# Patient Record
Sex: FEMALE | Race: WHITE | HISPANIC OR LATINO | Employment: PART TIME | URBAN - METROPOLITAN AREA
[De-identification: names, ages, dates, MRNs, and addresses within clinical notes are randomized per-mention and may not be internally consistent; named-entity substitution may affect disease eponyms.]

---

## 2017-06-12 ENCOUNTER — OFFICE VISIT (OUTPATIENT)
Dept: URGENT CARE | Facility: CLINIC | Age: 42
End: 2017-06-12
Payer: COMMERCIAL

## 2017-06-12 VITALS
WEIGHT: 170 LBS | BODY MASS INDEX: 27.32 KG/M2 | OXYGEN SATURATION: 99 % | RESPIRATION RATE: 14 BRPM | DIASTOLIC BLOOD PRESSURE: 60 MMHG | TEMPERATURE: 98.6 F | HEART RATE: 78 BPM | SYSTOLIC BLOOD PRESSURE: 124 MMHG | HEIGHT: 66 IN

## 2017-06-12 DIAGNOSIS — Z34.90 PREGNANCY, UNSPECIFIED GESTATIONAL AGE: ICD-10-CM

## 2017-06-12 LAB
APPEARANCE UR: CLEAR
BILIRUB UR STRIP-MCNC: NORMAL MG/DL
COLOR UR AUTO: NORMAL
GLUCOSE UR STRIP.AUTO-MCNC: NORMAL MG/DL
INT CON NEG: NEGATIVE
INT CON POS: POSITIVE
KETONES UR STRIP.AUTO-MCNC: NORMAL MG/DL
LEUKOCYTE ESTERASE UR QL STRIP.AUTO: NORMAL
NITRITE UR QL STRIP.AUTO: NORMAL
PH UR STRIP.AUTO: 6.5 [PH] (ref 5–8)
POC URINE PREGNANCY TEST: POSITIVE
PROT UR QL STRIP: NORMAL MG/DL
RBC UR QL AUTO: NORMAL
SP GR UR STRIP.AUTO: 1.01
UROBILINOGEN UR STRIP-MCNC: NORMAL MG/DL

## 2017-06-12 PROCEDURE — 81025 URINE PREGNANCY TEST: CPT | Performed by: PHYSICIAN ASSISTANT

## 2017-06-12 PROCEDURE — 81002 URINALYSIS NONAUTO W/O SCOPE: CPT | Performed by: PHYSICIAN ASSISTANT

## 2017-06-12 PROCEDURE — 99214 OFFICE O/P EST MOD 30 MIN: CPT | Performed by: PHYSICIAN ASSISTANT

## 2017-06-12 ASSESSMENT — ENCOUNTER SYMPTOMS
DIAPHORESIS: 0
PALPITATIONS: 0
NAUSEA: 1
ABDOMINAL PAIN: 0
DIARRHEA: 0
WEAKNESS: 0
CONSTIPATION: 0
CHILLS: 0
WEIGHT LOSS: 0
DIZZINESS: 0
HEARTBURN: 0
FEVER: 0
BLOOD IN STOOL: 0
COUGH: 0
VOMITING: 0
SHORTNESS OF BREATH: 0

## 2017-06-12 NOTE — PROGRESS NOTES
"Subjective:      Silvia Atkins is a 41 y.o. female who presents with Nausea            Nausea  This is a new problem. The current episode started 1 to 4 weeks ago. The problem occurs intermittently. The problem has been waxing and waning. Associated symptoms include nausea. Pertinent negatives include no abdominal pain, chest pain, chills, coughing, diaphoresis, fever, vomiting or weakness. Associated symptoms comments: Last menstruation 4/27/2017. Nothing aggravates the symptoms. She has tried nothing for the symptoms.       Review of Systems   Constitutional: Negative for fever, chills, weight loss, malaise/fatigue and diaphoresis.   Respiratory: Negative for cough and shortness of breath.    Cardiovascular: Negative for chest pain and palpitations.   Gastrointestinal: Positive for nausea. Negative for heartburn, vomiting, abdominal pain, diarrhea, constipation, blood in stool and melena.   Neurological: Negative for dizziness and weakness.     All other systems reviewed and are negative.  PMH:  has no past medical history on file.  MEDS:   Current outpatient prescriptions:   •  hydrocodone-acetaminophen (NORCO) 5-325 MG Tab per tablet, Take 1 Tab by mouth every 6 hours as needed (severe pain)., Disp: 20 Tab, Rfl: 0  ALLERGIES: No Known Allergies  SURGHX: History reviewed. No pertinent past surgical history.  SOCHX:  reports that she has never smoked. She does not have any smokeless tobacco history on file.  FH: Family history was reviewed, no pertinent findings to report  Medications, Allergies, and current problem list reviewed today in Epic       Objective:     /60 mmHg  Pulse 78  Temp(Src) 37 °C (98.6 °F)  Resp 14  Ht 1.676 m (5' 6\")  Wt 77.111 kg (170 lb)  BMI 27.45 kg/m2  SpO2 99%  LMP 04/27/2017  Breastfeeding? No     Physical Exam   Constitutional: She is oriented to person, place, and time. Vital signs are normal. She appears well-developed and well-nourished.  Non-toxic appearance. " She does not have a sickly appearance. She does not appear ill. No distress.   Neck: Normal range of motion. Neck supple.   Cardiovascular: Normal rate and regular rhythm.    Pulmonary/Chest: Effort normal and breath sounds normal.   Abdominal: Soft. Bowel sounds are normal. She exhibits no shifting dullness, no distension, no abdominal bruit, no pulsatile midline mass and no mass. There is no tenderness. There is no rigidity, no rebound, no guarding, no CVA tenderness, no tenderness at McBurney's point and negative Bradford's sign. No hernia.   Neurological: She is alert and oriented to person, place, and time.   Skin: Skin is warm and dry.   Psychiatric: She has a normal mood and affect. Her behavior is normal. Judgment and thought content normal.   Vitals reviewed.         Component Value Ref Range & Units Status      POC Color STRAW Negative Final     POC Appearance CLEAR Negative Final     POC Leukocyte Esterase NEG Negative Final     POC Nitrites NEG Negative Final     POC Urobiligen NEG Negative (0.2) mg/dL Final     POC Protein TR Negative mg/dL Final     POC Urine PH 6.5 5.0 - 8.0 Final     POC Blood LG Negative Final     POC Specific Gravity 1.010 <1.005 - >1.030 Final     POC Ketones NEG Negative mg/dL Final     POC Biliruben NEG Negative mg/dL Final     POC Glucose NEG Negative mg/dL Final       PREG: POS   Assessment/Plan:     1. Pregnancy, unspecified gestational age    - REFERRAL TO OB/GYN  - POCT Urinalysis  - POC Urine Pregnancy    Differential diagnosis, natural history, supportive care, and indications for immediate follow-up discussed at length.   Follow-up with primary care provider within 4-5 days, emergency room precautions discussed.  Patient and/or family appears understanding of information.

## 2017-06-12 NOTE — PATIENT INSTRUCTIONS
Embarazo  (Pregnancy)  Si planea quedar embarazada, es alisia buena idea concertar alisia ashley de preconcepción con el médico para poder lograr un estilo de roopa saludable ante de quedar embarazada. [place] incluye dieta, peso, ejercicio, el lexx vitaminas prenatales en especial ácido fólico (ayuda a prevenir defectos en el cerebro y la médula keller), evitar el alcohol, fumar, las drogas ilegales, problemas médicos (diabetes, convulsiones), historial familiar de problemas genéticos, condiciones de trabajo e inmunizaciones. Es mejor tener conocimiento de estas cosas y hacer algo antes de quedar embarazada.  Si está embarazada, es necesario que siga ciertas pautas para tener un bebé tatyana. Es muy importante realizar controles prenatales adecuados y seguir las indicaciones del profesional que la asiste. La atención prenatal incluye toda la asistencia médica que usted recibe antes del nacimiento del bebé. [place] ayuda a prevenir problemas coco el embarazo y el parto.  INSTRUCCIONES PARA EL CUIDADO DOMICILIARIO  · Comience las consultas prenatales alrededor de la 12ª semana de embarazo o lo antes posible. Al principio generalmente se programan cada mes. Se hacen más frecuentes en los 2 últimos meses antes del parto. Es importante que concurra a todas las citas con el profesional y siga kelly instrucciones con respecto a los medicamentos que deba utilizar, a la actividad física y a la dieta.  · Coco el embarazo debe obtener nutrientes para usted y para yanes bebé. Consuma alisia dieta normal y aravind balanceada. Elija alimentos kait carne, pescado, leche y otros productos lácteos, vegetales, frutas, panes integrales y cereales El profesional le informará cuál es el aumento de peso ideal, según yanes peso y altura actuales. Romelia gran cantidad de líquidos. Trate de beber 8 vasos de líquidos por día.  · El alcohol se asocia a cierto número de defectos del nacimiento, incluyendo el síndrome de alcoholismo fetal. Lo mejor es evitarlo  completamente El cigarrillo causa nacimientos prematuros y bebés de bajo peso al nacer. El consumo de alcohol y nicotina coco el embarazo también aumentan marcadamente la probabilidad de que el kyle sea químicamente dependiente en etapas posteriores de yanes roopa y puede contribuir al síndrome de muerte súbita infantil (SMSI)  · No consuma drogas.  · Solo tome medicamentos prescriptos o de venta julio c que le haya recomendado el profesional. Algunos medicamentos pueden causar problemas genéticos y físicos al bebé  · Las náuseas matinales pueden aliviarse si come algunas galletitas saladas en la cama. Coma dos galletitas antes de levantarse por la mañana.  · Las relaciones sexuales pueden continuarse hasta leslie el final del embarazo, si no se presentan otros problemas kait pérdida prematura (antes de tiempo) de líquido amniótico, hemorragia vaginal, dolor coco las relaciones sexuales o dolor abdominal (en el vientre).  · Practique ejercicios con regularidad. Consulte con el profesional que la asiste si no sabe con certeza si determinados ejercicios son seguros.  · No utilice la bañera con [place], nathalie turcos y saunas. Estos aumentan el riesgo de sufrir un desmayo o de pérdida del conocimiento, y así lastimarse usted o el bebé. La natación es un buen ejercicio. Descanse todo lo que pueda e incluya alisia siesta después de almorzar siempre que le sea posible, especialmente coco el tercer trimestre.  · Evite los olores y las sustancias químicas tóxicas.  · No use zapatos de tacones altos, podría perder el equilibrio y caer.  · No levante objetos de más de 2,5 kg. Si levanta un objeto, flexione las piernas y los muslos, y no la espalda.  · Evite los viajes largos, especialmente en el tercer trimestre.  · Si debe viajar fuera de la ciudad o de yanes estado, lleve alisia copia de la historia clínica.  SOLICITE ATENCIÓN MÉDICA DE INMEDIATO SI:  · La temperatura oral se eleva sin motivo por encima de 38,9° C (102° F) o  según le indique el profesional que lo asiste.  · Tiene alisia pérdida de líquido por la vagina. Si sospecha alisia ruptura de las membranas, tómese la temperatura y llame al profesional para informarlo sobre esto.  · Observa unas pequeñas manchas o alisia hemorragia vaginal Notifique al profesional acerca de la cantidad y de cuántos apósitos está utilizando.  · Continúa teniendo náuseas y no obtiene alivio de los medicamentos que le tipton indicado, o vomita reese o alisia sustancia similar a la borra del café.  · Presenta un dolor en la estefany superior del abdomen.  · Siente molestias en el ligamento liliana en la parte abdominal baja. El profesional que la asiste la evaluará.  · Siente pequeñas contracciones del útero (matriz)  · No siente que el bebé se mueve, o percibe menos movimientos que antes.  · Siente dolor al orinar.  · Observa alisia hemorragia vaginal anormal.  · Tiene diarrea persistente.  · Sufre alisia cefalea grave.  · Tiene problemas visuales.  · Comienza a sentir debilidad muscular.  · Se siente mareada o sufre un desmayo.  · Comienza a sentir falta de aire.  · Siente dolor en el pecho.  · Sufre dolor en la espalda que se irradia hacia la pierna y el pie.  · Siente latidos cardíacos irregulares o la frecuencia cardíaca es muy rápida.  · Aumenta excesivamente de peso en un período breve (2,5 kg en 3 a 5 días)  · Se ve envuelta en alisia situación de violencia doméstica.  Document Released: 09/27/2006 Document Revised: 03/11/2013  ExitBabyWatch® Patient Information ©2014 [place].

## 2017-06-12 NOTE — MR AVS SNAPSHOT
"        Silviafelicia Atkins   2017 1:15 PM   Office Visit   MRN: [id number]    Department:  [facility]   Dept Phone:  [phone]    Description:  Female : 1975   Provider:  Theo Marcus PA-C           Reason for Visit     Nausea pt wondering if she may be pregnant . lmp 4-27-16 . nausea .      Allergies as of 2017     No Known Allergies      You were diagnosed with     Pregnancy, unspecified gestational age   [[id number]]         Vital Signs     Blood Pressure Pulse Temperature Respirations Height Weight    124/60 mmHg 78 37 °C (98.6 °F) 14 1.676 m (5' 6\") 77.111 kg (170 lb)    Body Mass Index Oxygen Saturation Last Menstrual Period Breastfeeding? Smoking Status       27.45 kg/m2 99% 2017 No Never Smoker        Basic Information     Date Of Birth Sex Race Ethnicity Preferred Language    1975 Female White  Origin (Bulgarian,Somali,Pakistani,[place], etc) English      Health Maintenance        Date Due Completion Dates    IMM DTaP/Tdap/Td Vaccine (1 - Tdap) 10/14/1993 ---    PAP SMEAR 10/14/1995 ---    MAMMOGRAM 10/14/2014 ---            Results     POCT Urinalysis      Component Value Standard Range & Units    POC Color STRAW Negative    POC Appearance CLEAR Negative    POC Leukocyte Esterase NEG Negative    POC Nitrites NEG Negative    POC Urobiligen NEG Negative (0.2) mg/dL    POC Protein TR Negative mg/dL    POC Urine PH 6.5 5.0 - 8.0    POC Blood LG Negative    POC Specific Gravity 1.010 <1.005 - >1.030    POC Ketones NEG Negative mg/dL    POC Biliruben NEG Negative mg/dL    POC Glucose NEG Negative mg/dL                POC Urine Pregnancy      Component Value Standard Range & Units    POC Urine Pregnancy Test Positive Negative    Internal Control Positive Positive     Internal Control Negative Negative                         Current Immunizations     No immunizations on file.      Below and/or attached are the medications your provider expects you to take. Review " all of your home medications and newly ordered medications with your provider and/or pharmacist. Follow medication instructions as directed by your provider and/or pharmacist. Please keep your medication list with you and share with your provider. Update the information when medications are discontinued, doses are changed, or new medications (including over-the-counter products) are added; and carry medication information at all times in the event of emergency situations     Allergies:  No Known Allergies          Medications  Valid as of: June 12, 2017 -  2:33 PM    Generic Name Brand Name Tablet Size Instructions for use    Hydrocodone-Acetaminophen (Tab) NORCO 5-325 MG Take 1 Tab by mouth every 6 hours as needed (severe pain).        .                 Medicines prescribed today were sent to:     [facility] [address] [place] [address] [place], [place] - [address] AT [facility]    [address] [place] [address]    Phone: [phone] Fax: [phone]    Open 24 Hours?: No      Medication refill instructions:       If your prescription bottle indicates you have medication refills left, it is not necessary to call your provider’s office. Please contact your pharmacy and they will refill your medication.    If your prescription bottle indicates you do not have any refills left, you may request refills at any time through one of the following ways: The online [employer] system (except Urgent Care), by calling your provider’s office, or by asking your pharmacy to contact your provider’s office with a refill request. Medication refills are processed only during regular business hours and may not be available until the next business day. Your provider may request additional information or to have a follow-up visit with you prior to refilling your medication.   *Please Note: Medication refills are assigned a new Rx number when refilled electronically. Your pharmacy may indicate that no refills were authorized even  though a new prescription for the same medication is available at the pharmacy. Please request the medicine by name with the pharmacy before contacting your provider for a refill.        Referral     A referral request has been sent to our patient care coordination department. Please allow 3-5 business days for us to process this request and contact you either by phone or mail. If you do not hear from us by the 5th business day, please call us at [phone].        Instructions    Embarazo  (Pregnancy)  Si planea quedar embarazada, es alisia buena idea concertar alisia ashley de preconcepción con el médico para poder lograr un estilo de roopa saludable ante de quedar embarazada. [place] incluye dieta, peso, ejercicio, el lexx vitaminas prenatales en especial ácido fólico (ayuda a prevenir defectos en el cerebro y la médula keller), evitar el alcohol, fumar, las drogas ilegales, problemas médicos (diabetes, convulsiones), historial familiar de problemas genéticos, condiciones de trabajo e inmunizaciones. Es mejor tener conocimiento de estas cosas y hacer algo antes de quedar embarazada.  Si está embarazada, es necesario que siga ciertas pautas para tener un bebé tatyana. Es muy importante realizar controles prenatales adecuados y seguir las indicaciones del profesional que la asiste. La atención prenatal incluye toda la asistencia médica que usted recibe antes del nacimiento del bebé. [place] ayuda a prevenir problemas coco el embarazo y el parto.  INSTRUCCIONES PARA EL CUIDADO DOMICILIARIO  · Comience las consultas prenatales alrededor de la 12ª semana de embarazo o lo antes posible. Al principio generalmente se programan cada mes. Se hacen más frecuentes en los 2 últimos meses antes del parto. Es importante que concurra a todas las citas con el profesional y siga kelly instrucciones con respecto a los medicamentos que deba utilizar, a la actividad física y a la dieta.  · Coco el embarazo debe obtener nutrientes para usted  y para yanes bebé. Consuma alisia dieta normal y aravind balanceada. Elija alimentos kait carne, pescado, leche y otros productos lácteos, vegetales, frutas, panes integrales y cereales El profesional le informará cuál es el aumento de peso ideal, según yanes peso y altura actuales. Rom[place] gran cantidad de líquidos. Trate de beber 8 vasos de líquidos por día.  · El alcohol se asocia a cierto número de defectos del nacimiento, incluyendo el síndrome de alcoholismo fetal. Lo mejor es evitarlo completamente El cigarrillo causa nacimientos prematuros y bebés de bajo peso al nacer. El consumo de alcohol y nicotina coco el embarazo también aumentan marcadamente la probabilidad de que el kyle sea químicamente dependiente en etapas posteriores de yanes roopa y puede contribuir al síndrome de muerte súbita infantil (SMSI)  · No consuma drogas.  · Solo tome medicamentos prescriptos o de venta julio c que le haya recomendado el profesional. Algunos medicamentos pueden causar problemas genéticos y físicos al bebé  · Las náuseas matinales pueden aliviarse si come algunas galletitas saladas en la cama. Coma dos galletitas antes de levantarse por la mañana.  · Las relaciones sexuales pueden continuarse hasta leslie el final del embarazo, si no se presentan otros problemas kait pérdida prematura (antes de tiempo) de líquido amniótico, hemorragia vaginal, dolor coco las relaciones sexuales o dolor abdominal (en el vientre).  · Practique ejercicios con regularidad. Consulte con el profesional que la asiste si no sabe con certeza si determinados ejercicios son seguros.  · No utilice la bañera con [place], nathalie turcos y saunas. Estos aumentan el riesgo de sufrir un desmayo o de pérdida del conocimiento, y así lastimarse usted o el bebé. La natación es un buen ejercicio. Descanse todo lo que pueda e incluya alisia siesta después de almorzar siempre que le sea posible, especialmente coco el tercer trimestre.  · Evite los olores y las sustancias  químicas tóxicas.  · No use zapatos de tacones altos, podría perder el equilibrio y caer.  · No levante objetos de más de 2,5 kg. Si levanta un objeto, flexione las piernas y los muslos, y no la espalda.  · Evite los viajes largos, especialmente en el tercer trimestre.  · Si debe viajar fuera de la ciudad o de yanes estado, lleve alisia copia de la historia clínica.  SOLICITE ATENCIÓN MÉDICA DE INMEDIATO SI:  · La temperatura oral se eleva sin motivo por encima de 38,9° C (102° F) o según le indique el profesional que lo asiste.  · Tiene alisia pérdida de líquido por la vagina. Si sospecha alisia ruptura de las membranas, tómese la temperatura y llame al profesional para informarlo sobre esto.  · Observa unas pequeñas manchas o alisia hemorragia vaginal Notifique al profesional acerca de la cantidad y de cuántos apósitos está utilizando.  · Continúa teniendo náuseas y no obtiene alivio de los medicamentos que le tipton indicado, o vomita reese o alisia sustancia similar a la borra del café.  · Presenta un dolor en la estefany superior del abdomen.  · Siente molestias en el ligamento liliana en la parte abdominal baja. El profesional que la asiste la evaluará.  · Siente pequeñas contracciones del útero (matriz)  · No siente que el bebé se mueve, o percibe menos movimientos que antes.  · Siente dolor al orinar.  · Observa alisia hemorragia vaginal anormal.  · Tiene diarrea persistente.  · Sufre alisia cefalea grave.  · Tiene problemas visuales.  · Comienza a sentir debilidad muscular.  · Se siente mareada o sufre un desmayo.  · Comienza a sentir falta de aire.  · Siente dolor en el pecho.  · Sufre dolor en la espalda que se irradia hacia la pierna y el pie.  · Siente latidos cardíacos irregulares o la frecuencia cardíaca es muy rápida.  · Aumenta excesivamente de peso en un período breve (2,5 kg en 3 a 5 días)  · Se ve envuelta en alisia situación de violencia doméstica.  Document Released: 09/27/2006 Document Revised: 03/11/2013  ExitCare® Patient  Information ©2014 [place] [employer], [facility].              [employer] Access Code: Activation code not generated  Current [employer] Status: Active

## 2017-07-12 LAB — PHYSICIAN READ PAP  881411: NEGATIVE

## 2017-07-18 ENCOUNTER — APPOINTMENT (OUTPATIENT)
Dept: OBGYN | Facility: CLINIC | Age: 42
End: 2017-07-18
Payer: COMMERCIAL

## 2017-08-04 ENCOUNTER — HOSPITAL ENCOUNTER (OUTPATIENT)
Dept: LAB | Facility: MEDICAL CENTER | Age: 42
End: 2017-08-04
Attending: NURSE PRACTITIONER
Payer: COMMERCIAL

## 2017-08-04 ENCOUNTER — INITIAL PRENATAL (OUTPATIENT)
Dept: OBGYN | Facility: CLINIC | Age: 42
End: 2017-08-04

## 2017-08-04 VITALS — HEIGHT: 66 IN

## 2017-08-04 DIAGNOSIS — Z87.59 HISTORY OF PRECIPITOUS DELIVERY: ICD-10-CM

## 2017-08-04 DIAGNOSIS — Z98.890 HISTORY OF REVERSAL OF TUBAL LIGATION: ICD-10-CM

## 2017-08-04 DIAGNOSIS — Z98.51 HISTORY OF BILATERAL TUBAL LIGATION: ICD-10-CM

## 2017-08-04 DIAGNOSIS — Z34.82 ENCOUNTER FOR SUPERVISION OF OTHER NORMAL PREGNANCY IN SECOND TRIMESTER: ICD-10-CM

## 2017-08-04 DIAGNOSIS — O09.522 ADVANCED MATERNAL AGE IN MULTIGRAVIDA, SECOND TRIMESTER: ICD-10-CM

## 2017-08-04 DIAGNOSIS — Z34.82 ENCOUNTER FOR SUPERVISION OF OTHER NORMAL PREGNANCY IN SECOND TRIMESTER: Primary | ICD-10-CM

## 2017-08-04 PROBLEM — O09.529 ADVANCED MATERNAL AGE IN MULTIGRAVIDA: Status: ACTIVE | Noted: 2017-08-04

## 2017-08-04 LAB
ABO GROUP BLD: NORMAL
APPEARANCE UR: CLEAR
APPEARANCE UR: NORMAL
BASOPHILS # BLD AUTO: 0.5 % (ref 0–1.8)
BASOPHILS # BLD: 0.05 K/UL (ref 0–0.12)
BILIRUB UR QL STRIP.AUTO: NEGATIVE
BILIRUB UR STRIP-MCNC: NORMAL MG/DL
BLD GP AB SCN SERPL QL: NORMAL
COLOR UR AUTO: NORMAL
COLOR UR: ABNORMAL
CULTURE IF INDICATED INDCX: NO UA CULTURE
EOSINOPHIL # BLD AUTO: 0.1 K/UL (ref 0–0.51)
EOSINOPHIL NFR BLD: 1 % (ref 0–6.9)
ERYTHROCYTE [DISTWIDTH] IN BLOOD BY AUTOMATED COUNT: 40.5 FL (ref 35.9–50)
GLUCOSE UR STRIP.AUTO-MCNC: NEGATIVE MG/DL
GLUCOSE UR STRIP.AUTO-MCNC: NEGATIVE MG/DL
HBV SURFACE AG SER QL: NEGATIVE
HCT VFR BLD AUTO: 39.5 % (ref 37–47)
HGB BLD-MCNC: 13.1 G/DL (ref 12–16)
HIV 1+2 AB+HIV1 P24 AG SERPL QL IA: NON REACTIVE
IMM GRANULOCYTES # BLD AUTO: 0.03 K/UL (ref 0–0.11)
IMM GRANULOCYTES NFR BLD AUTO: 0.3 % (ref 0–0.9)
KETONES UR STRIP.AUTO-MCNC: NEGATIVE MG/DL
KETONES UR STRIP.AUTO-MCNC: NEGATIVE MG/DL
LEUKOCYTE ESTERASE UR QL STRIP.AUTO: NEGATIVE
LEUKOCYTE ESTERASE UR QL STRIP.AUTO: NEGATIVE
LYMPHOCYTES # BLD AUTO: 2.04 K/UL (ref 1–4.8)
LYMPHOCYTES NFR BLD: 21.3 % (ref 22–41)
MCH RBC QN AUTO: 28.9 PG (ref 27–33)
MCHC RBC AUTO-ENTMCNC: 33.2 G/DL (ref 33.6–35)
MCV RBC AUTO: 87 FL (ref 81.4–97.8)
MICRO URNS: ABNORMAL
MONOCYTES # BLD AUTO: 0.53 K/UL (ref 0–0.85)
MONOCYTES NFR BLD AUTO: 5.5 % (ref 0–13.4)
NEUTROPHILS # BLD AUTO: 6.85 K/UL (ref 2–7.15)
NEUTROPHILS NFR BLD: 71.4 % (ref 44–72)
NITRITE UR QL STRIP.AUTO: NEGATIVE
NITRITE UR QL STRIP.AUTO: NEGATIVE
NRBC # BLD AUTO: 0 K/UL
NRBC BLD AUTO-RTO: 0 /100 WBC
PH UR STRIP.AUTO: 7 [PH]
PH UR STRIP.AUTO: 7 [PH] (ref 5–8)
PLATELET # BLD AUTO: 257 K/UL (ref 164–446)
PMV BLD AUTO: 9.7 FL (ref 9–12.9)
PROT UR QL STRIP: NEGATIVE MG/DL
PROT UR QL STRIP: NORMAL MG/DL
RBC # BLD AUTO: 4.54 M/UL (ref 4.2–5.4)
RBC UR QL AUTO: NEGATIVE
RBC UR QL AUTO: NORMAL
RH BLD: NORMAL
RUBV AB SER QL: 289.6 IU/ML
SP GR UR STRIP.AUTO: 1
SP GR UR STRIP.AUTO: 1.01
TREPONEMA PALLIDUM IGG+IGM AB [PRESENCE] IN SERUM OR PLASMA BY IMMUNOASSAY: NON REACTIVE
UROBILINOGEN UR STRIP-MCNC: NORMAL MG/DL
UROBILINOGEN UR STRIP.AUTO-MCNC: 0.2 MG/DL
WBC # BLD AUTO: 9.6 K/UL (ref 4.8–10.8)

## 2017-08-04 PROCEDURE — 81002 URINALYSIS NONAUTO W/O SCOPE: CPT | Performed by: NURSE PRACTITIONER

## 2017-08-04 PROCEDURE — 59401 PR NEW OB VISIT: CPT | Performed by: NURSE PRACTITIONER

## 2017-08-04 PROCEDURE — 8198 PREG CTR PKG RATE (SYSTEM): Performed by: NURSE PRACTITIONER

## 2017-08-04 ASSESSMENT — ENCOUNTER SYMPTOMS
GASTROINTESTINAL NEGATIVE: 1
CONSTITUTIONAL NEGATIVE: 1
MUSCULOSKELETAL NEGATIVE: 1
EYES NEGATIVE: 1
PSYCHIATRIC NEGATIVE: 1
CARDIOVASCULAR NEGATIVE: 1
RESPIRATORY NEGATIVE: 1
NEUROLOGICAL NEGATIVE: 1

## 2017-08-04 NOTE — Clinical Note
August 4, 2017            Silvia Atkins is currently being cared for at [facility].  This patient is pregnant and may continue to work.  She should not lift greater than 20 pounds and requires frequent rest periods (10 minutes every two hours).        Thank you,          Cherise Rojo C.N.M.

## 2017-08-04 NOTE — Clinical Note
Cystic Fibrosis Carrier Testing  Silvia Atkins    The following information is about a blood test that can be done to determine if you and/or your partner carry the gene for cystic fibrosis.    WHAT IS CYSTIC FIBROSIS?  · Cystic fibrosis (CF) is an inherited disease that affects more than 25,000 American children and young adults.  · Symptoms of CF vary but include lung congestion, pneumonia, diarrhea and poor growth.  Most people with CF have severe medical problems and some die at a young age.  Others have so few symptoms they are unaware they have CF.  · CF does not affect intelligence.  · Although there is no cure for CF at this time, scientists are making progress in improving treatment and in searching for a cure.  In the past many people with CF  at a very young age.  Today, many are living into their 20’s and 30’s.    IS THERE A CHANCE MY BABY COULD HAVE CYSTIC FIBROSIS?  · You can have a child with CF even if there is no history in your family (see chart below).  · CF testing can help determine if you are a carrier and at risk to have a child with CF.  Note: if both parents are carriers, there is a 1 in 4 (25%) chance with each pregnancy that they will have a child with CF.  · Carriers have one normal CF gene and one altered CF gene.  · People with CF have two altered CF genes.  · Most people have two normal copies of the CF gene.    Approximate risk that a couple with no family history of cystic fibrosis will have a child with cystic fibrosis:    Ethnic background / Risk     couple:  1 in 2,500   couple:  1 in 15,000            couple:  1 in 8,000     American couple:  1 in 32,000     WHAT TESTING IS AVAILABLE?  · There is a blood test that can be done to find out if you or your partner is a carrier.  · It is important to understand that CF carrier testing does not detect all CF carriers.  · If the test shows that you are both CF carriers, you unborn baby can  be tested to find out if the baby has CF.    HOW MUCH DOES IT COST TO HAVE CYSTIC FIBROSIS CARRIER TESTING?  · Cost and insurance coverage for CF carrier testing vary depending upon the laboratory used and your insurance policy.  · The average cost for CF carrier testing is $780 per person.  · Your genetic counselor can provide you with more information about cystic fibrosis carrier testing.    _____  Yes, I am interested in discussing carrier testing with a genetic counselor.    _____  No, I am not interested in CF carrier testing or in receiving more information about CF carrier testing.      Client signature: ________________________________________  8/4/2017

## 2017-08-04 NOTE — PROGRESS NOTES
"S:  Silvia Atkins is a 41 y.o.  who presents for her new OB exam.  She is 14w1d with and MERLY of Estimated Date of Delivery: 18 based off of US . She has no complaints.  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 No ER visits or previous care in this pregnancy.     Declines AFP.  Declines CF.  Denies VB, LOF, or cramping.  Denies dysuria, vaginal DC. Reports some fetal movement.     Pt is in a committed relationship and lives with her FOB.  Pregnancy is planned and desired.      Past Medical History   Diagnosis Date   • Headache      Family History   Problem Relation Age of Onset   • Diabetes Mother    • Cancer Mother 60     cervical   • Diabetes Father      Social History     Social History   • Marital Status: Single     Spouse Name: N/A   • Number of Children: N/A   • Years of Education: N/A     Occupational History   • Not on file.     Social History Main Topics   • Smoking status: Never Smoker    • Smokeless tobacco: Never Used   • Alcohol Use: No   • Drug Use: No   • Sexual Activity:     Partners: Male      Comment: none. Planned pregnancy     Other Topics Concern   • Not on file     Social History Narrative     OB History    Para Term  AB SAB TAB Ectopic Multiple Living   5 4 4       4      # Outcome Date GA Lbr Shaun/2nd Weight Sex Delivery Anes PTL Lv   5 Current            4 Term 98 40w0d  3.856 kg (8 lb 8 oz) F Vag-Spont None N Y   3 Term 94 39w0d  3 kg (6 lb 9.8 oz) F Vag-Spont None Y Y   2 Term 93 40w0d  3.4 kg (7 lb 7.9 oz) M Vag-Spont None N Y   1 Term 92 40w0d  3.5 kg (7 lb 11.5 oz) M Vag-Spont EPI N Y          History of Varicella Virus: Yes  History of HSV I or II in self or partner: No  History of Thyroid problems: No    O:  Height 1.676 m (5' 6\"), last menstrual period 2017.   See Prenatal Physical.    Wet mount: Deferred, no s/sx      A:   1.  IUP @ 14w1d per US        2.  S=D        3.  See " "problem list below        4.  Advanced maternal age       Patient Active Problem List    Diagnosis Date Noted   • Advanced maternal age in multigravida 08/04/2017   • History of bilateral tubal ligation 08/04/2017   • History of reversal of tubal ligation 08/04/2017         P:  1.  GC/CT & pap done        2.  Prenatal labs ordered - lab slip given        3.  Discussed PNV, diet, avoidances and adequate water intake        4.  NOB packet given        5.  Return to office in 4 wks        6.  Complete OB US in 5-6 wks            No orders of the defined types were placed in this encounter.       HPI    Review of Systems   Constitutional: Negative.    HENT: Negative.    Eyes: Negative.    Respiratory: Negative.    Cardiovascular: Negative.    Gastrointestinal: Negative.    Genitourinary: Negative.    Musculoskeletal: Negative.    Skin: Negative.    Neurological: Negative.    Endo/Heme/Allergies: Negative.    Psychiatric/Behavioral: Negative.    All other systems reviewed and are negative.         Objective:     Ht 1.676 m (5' 6\")  LMP 04/27/2017     Physical Exam   Constitutional: She is oriented to person, place, and time. She appears well-developed and well-nourished.   HENT:   Head: Normocephalic and atraumatic.   Nose: Nose normal.   Eyes: Conjunctivae and EOM are normal.   Neck: Normal range of motion. Neck supple.   Cardiovascular: Normal rate, regular rhythm, normal heart sounds and intact distal pulses.    Pulmonary/Chest: Effort normal and breath sounds normal.   Abdominal: Soft. Bowel sounds are normal.   Genitourinary: Vagina normal. Uterus is enlarged.   Musculoskeletal: Normal range of motion.   Neurological: She is alert and oriented to person, place, and time. She has normal reflexes.   Skin: Skin is warm and dry.   Psychiatric: She has a normal mood and affect. Her behavior is normal. Judgment and thought content normal.   Nursing note and vitals reviewed.         Assessment/Plan:     1. Encounter for " supervision of other normal pregnancy in second trimester  MERLY 2/1/18 per US  - PREG CNTR PRENATAL PN; Future  - POCT Urinalysis  - US-OB 2ND 3RD TRI COMPLETE; Future    2. Advanced maternal age in multigravida, second trimester  Will be 42 yoa at term    3. History of bilateral tubal ligation  Done in 1998 in [place]    4. History of reversal of tubal ligation  Done in 4/2016 in [place]

## 2017-08-04 NOTE — Clinical Note
2017      Silvia Atkins is currently pregnant and being cared for by [facility].     She is medically cleared for:    1. Dental exams and routine cleaning  2. Tooth fillings and extractions as needed  3. Antibiotic therapy as appropriate  4. Local anesthesia  5. Use X-ray abdominal shield    Patient may be administered the followin% Lidocaine with 1:100,000 Epinephrine  4% Septocaine with 1:100,000 Epinephrine  Nitrous Oxide  A narcotic or non-narcotic pain medication  An antibiotic such as penicillin or clindamycin    NO TETRACYCLINE, NO IBUPROFEN and NO CODEINE        Thank you,          Cherise Rojo C.N.M.    Electronically Signed

## 2017-08-04 NOTE — MR AVS SNAPSHOT
"        Silvia Atkins   2017 2:00 PM   Initial Prenatal   MRN: [id number]    Department[facility]   Dept Phone:  [phone]    Description:  Female : 1975   Provider:  Cherise Rojo C.N.M.; [facility] INTAKE           Allergies as of 2017     No Known Allergies      You were diagnosed with     Encounter for supervision of other normal pregnancy in second trimester   [7050638]  -  Primary     Advanced maternal age in multigravida, second trimester   [6464493]       History of bilateral tubal ligation   [1079138]       History of reversal of tubal ligation   [8865068]       History of precipitous delivery   [8032843]         Vital Signs     Height Last Menstrual Period Smoking Status             1.676 m (5' 6\") 2017 Never Smoker          Basic Information     Date Of Birth Sex Race Ethnicity Preferred Language    1975 Female White  Origin (Malagasy,Kenyan,Cape Verdean,Neeraj, etc) English      Your appointments     Sep 01, 2017  4:15 PM   OB Follow Up with MATTHIEU Smith   [facility] [address] [address]   [phone]              Problem List              ICD-10-CM Priority Class Noted - Resolved    Advanced maternal age in multigravida O09.529   2017 - Present    History of bilateral tubal ligation Z98.51   2017 - Present    History of reversal of tubal ligation Z98.890   2017 - Present    History of precipitous delivery Z87.59   2017 - Present      Health Maintenance        Date Due Completion Dates    IMM DTaP/Tdap/Td Vaccine (1 - Tdap) 10/14/1993 ---    PAP SMEAR 10/14/1995 ---    MAMMOGRAM 10/14/2014 ---    IMM INFLUENZA (1) 2017 ---            Results     POCT Urinalysis      Component Value Standard Range & Units    POC Color  Negative    POC Appearance  Negative    POC Leukocyte Esterase negative Negative    POC Nitrites negative Negative    `    POC Urobiligen  Negative (0.2) mg/dL    POC " Protein trace Negative mg/dL    POC Urine PH 7.0 5.0 - 8.0    POC Blood trace Negative    POC Specific Gravity 1.005 <1.005 - >1.030    POC Ketones negative Negative mg/dL    POC Biliruben  Negative mg/dL    POC Glucose negative Negative mg/dL                        Current Immunizations     No immunizations on file.      Below and/or attached are the medications your provider expects you to take. Review all of your home medications and newly ordered medications with your provider and/or pharmacist. Follow medication instructions as directed by your provider and/or pharmacist. Please keep your medication list with you and share with your provider. Update the information when medications are discontinued, doses are changed, or new medications (including over-the-counter products) are added; and carry medication information at all times in the event of emergency situations     Allergies:  No Known Allergies          Medications  Valid as of: August 04, 2017 -  3:14 PM    Generic Name Brand Name Tablet Size Instructions for use    Hydrocodone-Acetaminophen (Tab) NORCO 5-325 MG Take 1 Tab by mouth every 6 hours as needed (severe pain).        Prenatal Multivit-Min-Fe-FA   Take  by mouth.        .                 Medicines prescribed today were sent to:     [facility] [address] [place] [address] [place], [place] - [address] AT [facility]    [address] [place] [address]    Phone: [phone] Fax: [phone]    Open 24 Hours?: No      Medication refill instructions:       If your prescription bottle indicates you have medication refills left, it is not necessary to call your provider’s office. Please contact your pharmacy and they will refill your medication.    If your prescription bottle indicates you do not have any refills left, you may request refills at any time through one of the following ways: The online [employer] system (except Urgent Care), by calling your provider’s office, or by asking your  pharmacy to contact your provider’s office with a refill request. Medication refills are processed only during regular business hours and may not be available until the next business day. Your provider may request additional information or to have a follow-up visit with you prior to refilling your medication.   *Please Note: Medication refills are assigned a new Rx number when refilled electronically. Your pharmacy may indicate that no refills were authorized even though a new prescription for the same medication is available at the pharmacy. Please request the medicine by name with the pharmacy before contacting your provider for a refill.        Your To Do List     Future Labs/Procedures Complete By Expires    PREG CNTR PRENATAL PN  As directed 8/4/2018    US-OB 2ND 3RD TRI COMPLETE  As directed 8/4/2018         MyChart Access Code: Activation code not generated  [facility] Status: Active

## 2017-08-04 NOTE — PROGRESS NOTES
Pt here today for New OB visit   [facility]  Planned Pregnancy   Pt had BTL 1998 and Reversal 2016 in [facility]  Last Pap don 7/12/17 at [facility]  C/o headaches she gets dizzy when she gets them and if she isn't resting during headaches her heart starts racing  Declines CF  Desires Dental  Declines BTL  LMP 4/27/17  Reports some FM  Good # [phone]    Pharmacy verified

## 2017-08-14 ENCOUNTER — DATING (OUTPATIENT)
Dept: OBGYN | Facility: CLINIC | Age: 42
End: 2017-08-14

## 2017-09-01 ENCOUNTER — ROUTINE PRENATAL (OUTPATIENT)
Dept: OBGYN | Facility: CLINIC | Age: 42
End: 2017-09-01

## 2017-09-01 ENCOUNTER — HOSPITAL ENCOUNTER (OUTPATIENT)
Dept: LAB | Facility: MEDICAL CENTER | Age: 42
End: 2017-09-01
Attending: PHYSICIAN ASSISTANT
Payer: COMMERCIAL

## 2017-09-01 VITALS — WEIGHT: 183 LBS | SYSTOLIC BLOOD PRESSURE: 100 MMHG | BODY MASS INDEX: 29.54 KG/M2 | DIASTOLIC BLOOD PRESSURE: 60 MMHG

## 2017-09-01 DIAGNOSIS — O09.522 ADVANCED MATERNAL AGE IN MULTIGRAVIDA, SECOND TRIMESTER: ICD-10-CM

## 2017-09-01 PROCEDURE — 81511 FTL CGEN ABNOR FOUR ANAL: CPT

## 2017-09-01 PROCEDURE — 90040 PR PRENATAL FOLLOW UP: CPT | Performed by: PHYSICIAN ASSISTANT

## 2017-09-01 PROCEDURE — 36415 COLL VENOUS BLD VENIPUNCTURE: CPT

## 2017-09-01 NOTE — PROGRESS NOTES
Pt. Here for OB/FU today. Reports Good FM.   U/S on 9/15/17  Good # [phone]  Pt states no complaints.   Pharmacy verified.   AFP lab slip given today along with instruction.

## 2017-09-01 NOTE — PROGRESS NOTES
Pt has no complaints with cramping, bleeding or pain. +FM. PNL, PAP wnl - pt notified of results. AFP slip given today. US to be done 9/15. RTC 4 wk or sooner prn.

## 2017-09-04 LAB
# FETUSES US: NORMAL
AFP MOM SERPL: 0.68
AFP SERPL-MCNC: 26 NG/ML
AGE - REPORTED: 43.3 YR
GA METHOD: NORMAL
GA: 18.14 WEEKS
HCG MOM SERPL: 0.31
HCG SERPL-ACNC: 6004 IU/L
IDDM PATIENT QL: NO
INHIBIN A MOM SERPL: 0.75
INHIBIN A SERPL-MCNC: 110 PG/ML
INTEGRATED SCN PATIENT-IMP: NORMAL
PATHOLOGY STUDY: NORMAL
U ESTRIOL MOM SERPL: 1.1
U ESTRIOL SERPL-MCNC: 1.56 NG/ML

## 2017-09-15 ENCOUNTER — DATING (OUTPATIENT)
Dept: OBGYN | Facility: CLINIC | Age: 42
End: 2017-09-15

## 2017-09-15 ENCOUNTER — APPOINTMENT (OUTPATIENT)
Dept: RADIOLOGY | Facility: IMAGING CENTER | Age: 42
End: 2017-09-15
Attending: NURSE PRACTITIONER

## 2017-09-15 DIAGNOSIS — Z34.82 ENCOUNTER FOR SUPERVISION OF OTHER NORMAL PREGNANCY IN SECOND TRIMESTER: ICD-10-CM

## 2017-09-15 PROCEDURE — 76805 OB US >/= 14 WKS SNGL FETUS: CPT | Performed by: OBSTETRICS & GYNECOLOGY

## 2017-09-29 ENCOUNTER — ROUTINE PRENATAL (OUTPATIENT)
Dept: OBGYN | Facility: CLINIC | Age: 42
End: 2017-09-29

## 2017-09-29 VITALS — BODY MASS INDEX: 30.83 KG/M2 | SYSTOLIC BLOOD PRESSURE: 112 MMHG | DIASTOLIC BLOOD PRESSURE: 62 MMHG | WEIGHT: 191 LBS

## 2017-09-29 DIAGNOSIS — O09.522 ADVANCED MATERNAL AGE IN MULTIGRAVIDA, SECOND TRIMESTER: ICD-10-CM

## 2017-09-29 PROCEDURE — 90040 PR PRENATAL FOLLOW UP: CPT | Performed by: NURSE PRACTITIONER

## 2017-09-29 NOTE — PROGRESS NOTES
Ob f/u.  No VB, LOF or contractions   C/O no complaints today   Phone number # [phone]  Pharmacy verified with patient  HD=949 lbs              BP= 112/62  Flu shot was offered today, pt declines

## 2017-09-29 NOTE — PROGRESS NOTES
SUBJECTIVE:  Pt is a 41 y.o.   at 22w1d  gestation. Presents today for follow-up prenatal care. Reports no issues at this time.  Reports positive  fetal movement. Denies cramping/contractions, bleeding or leaking of fluid. Denies dysuria, headaches, N/V, or other issues at this time. Generally feels well today.     OBJECTIVE:  - See prenatal vitals flow  -   Vitals:    17 1612   BP: 112/62   Weight: 86.6 kg (191 lb)      - Pertinent Labs: normal prenatal panel, normal AFP  - Pertinent ultrasound: Normal fetal survey            ASSESSMENT:   - IUP at 22w1d   - S=D   -   Patient Active Problem List    Diagnosis Date Noted   • Advanced maternal age in multigravida 2017   • History of bilateral tubal ligation 2017   • History of reversal of tubal ligation 2017   • History of precipitous delivery 2017         PLAN:  - S/sx pregnancy and labor warning signs vs general discomforts discussed  - Fetal movements and kick counts reviewed   - Adequate hydration reinforced  - Nutrition/exercise/vitamin education: continued PNV  Does not want BTL.  Declines Flu shot.

## 2017-10-26 ENCOUNTER — ROUTINE PRENATAL (OUTPATIENT)
Dept: OBGYN | Facility: CLINIC | Age: 42
End: 2017-10-26

## 2017-10-26 VITALS — BODY MASS INDEX: 31.96 KG/M2 | SYSTOLIC BLOOD PRESSURE: 92 MMHG | WEIGHT: 198 LBS | DIASTOLIC BLOOD PRESSURE: 62 MMHG

## 2017-10-26 DIAGNOSIS — Z34.03 ENCOUNTER FOR SUPERVISION OF NORMAL FIRST PREGNANCY IN THIRD TRIMESTER: ICD-10-CM

## 2017-10-26 PROCEDURE — 90040 PR PRENATAL FOLLOW UP: CPT | Performed by: NURSE PRACTITIONER

## 2017-10-26 NOTE — PROGRESS NOTES
SUBJECTIVE:  Pt is a 42 y.o.   at 26w0d  gestation. Presents today for follow-up prenatal care. Reports no issues at this time except for frequent nose bleeding.  Reports good  fetal movement. Denies cramping/contractions, bleeding or leaking of fluid. Denies dysuria, headaches, N/V, or other issues at this time. Generally feels well today.     OBJECTIVE:  - See prenatal vitals flow  -   Vitals:    10/26/17 1610   BP: (!) 92/62   Weight: 89.8 kg (198 lb)      - Pertinent Labs: normal prenatal panel, neg afp  - Pertinent ultrasound: Normal fetal survey            ASSESSMENT:   - IUP at 26w0d    - S=D   -   Patient Active Problem List    Diagnosis Date Noted   • Advanced maternal age in multigravida 2017   • History of bilateral tubal ligation 2017   • History of reversal of tubal ligation 2017   • History of precipitous delivery 2017         PLAN:  - S/sx pregnancy and labor warning signs vs general discomforts discussed  - Fetal movements and kick counts reviewed   - Adequate hydration reinforced  - Nutrition/exercise/vitamin education: continued PNV  - Encouraged tour of LnD/childbirth education classes: contact info provided   - nose bleeding, dry sinus relief measures. Lab slip and instructions for glucose.

## 2017-11-09 ENCOUNTER — HOSPITAL ENCOUNTER (OUTPATIENT)
Dept: LAB | Facility: MEDICAL CENTER | Age: 42
End: 2017-11-09
Attending: NURSE PRACTITIONER
Payer: COMMERCIAL

## 2017-11-09 DIAGNOSIS — Z34.03 ENCOUNTER FOR SUPERVISION OF NORMAL FIRST PREGNANCY IN THIRD TRIMESTER: ICD-10-CM

## 2017-11-09 LAB
GLUCOSE 1H P 50 G GLC PO SERPL-MCNC: 191 MG/DL (ref 70–139)
HCT VFR BLD AUTO: 37.1 % (ref 37–47)
HGB BLD-MCNC: 12.5 G/DL (ref 12–16)
TREPONEMA PALLIDUM IGG+IGM AB [PRESENCE] IN SERUM OR PLASMA BY IMMUNOASSAY: NON REACTIVE

## 2017-11-10 DIAGNOSIS — R73.09 ELEVATED GLUCOSE TOLERANCE TEST: ICD-10-CM

## 2017-11-13 ENCOUNTER — TELEPHONE (OUTPATIENT)
Dept: OBGYN | Facility: CLINIC | Age: 42
End: 2017-11-13

## 2017-11-13 NOTE — TELEPHONE ENCOUNTER
----- Message from Bonita Mora D.N.P. sent at 11/10/2017  9:13 AM PST -----  Needs 3 hour glucose asap  11/13/17 @ 8:47 . N/a, msg left for patient to call back.  Patient called back. Patient notified about abnormal 1 GTT and need for 3 GTT, instructions for test given to patient: fasting from 10:00pm night before test ( plain water is ok), call laboratory to schedule appt. Bring something to eat for after test is done. Aware she needs to stay there for the 3 hrs. she agrees to do it ASAP.

## 2017-11-15 ENCOUNTER — ROUTINE PRENATAL (OUTPATIENT)
Dept: OBGYN | Facility: CLINIC | Age: 42
End: 2017-11-15

## 2017-11-15 ENCOUNTER — HOSPITAL ENCOUNTER (OUTPATIENT)
Dept: LAB | Facility: MEDICAL CENTER | Age: 42
End: 2017-11-15
Attending: NURSE PRACTITIONER
Payer: COMMERCIAL

## 2017-11-15 VITALS — DIASTOLIC BLOOD PRESSURE: 70 MMHG | WEIGHT: 199 LBS | BODY MASS INDEX: 32.12 KG/M2 | SYSTOLIC BLOOD PRESSURE: 98 MMHG

## 2017-11-15 DIAGNOSIS — R73.09 ELEVATED GLUCOSE TOLERANCE TEST: ICD-10-CM

## 2017-11-15 DIAGNOSIS — O09.523 ELDERLY MULTIGRAVIDA IN THIRD TRIMESTER: ICD-10-CM

## 2017-11-15 LAB
GLUCOSE 1H P CHAL SERPL-MCNC: 235 MG/DL (ref 65–180)
GLUCOSE 2H P CHAL SERPL-MCNC: 143 MG/DL (ref 65–155)
GLUCOSE 3H P CHAL SERPL-MCNC: 54 MG/DL (ref 65–140)
GLUCOSE BS SERPL-MCNC: 82 MG/DL (ref 65–95)

## 2017-11-15 PROCEDURE — 90040 PR PRENATAL FOLLOW UP: CPT | Performed by: NURSE PRACTITIONER

## 2017-11-15 ASSESSMENT — PATIENT HEALTH QUESTIONNAIRE - PHQ9: CLINICAL INTERPRETATION OF PHQ2 SCORE: 0

## 2017-11-15 NOTE — PROGRESS NOTES
SUBJECTIVE:  Pt is a 42 y.o.   at 28w6d  gestation. Presents today for follow-up prenatal care. Reports no issues at this time.  Reports good  fetal movement. Denies cramping/contractions, bleeding or leaking of fluid. Denies dysuria, headaches, N/V. Generally feels well today.     OBJECTIVE:  - See prenatal vitals flow  -   Vitals:    11/15/17 1335   BP: (!) 98/70   Weight: 90.3 kg (199 lb)      - Pertinent Labs: normal prenatal panel, elevated 1 hour glucose. 3 hour glucose pending and was done this morning.   - Pertinent ultrasound: Normal fetal survey            ASSESSMENT:   - IUP at 28w6d   - S=D   -   Patient Active Problem List    Diagnosis Date Noted   • Elevated glucose tolerance test 11/10/2017   • Advanced maternal age in multigravida 2017   • History of bilateral tubal ligation 2017   • History of reversal of tubal ligation 2017   • History of precipitous delivery 2017         PLAN:  - S/sx pregnancy and labor warning signs vs general discomforts discussed  - Fetal movements and kick counts reviewed   - Adequate hydration reinforced  - Nutrition/exercise/vitamin education: continued PNV  - Does not want BTL.

## 2017-11-15 NOTE — PROGRESS NOTES
Ob f/u. + fetal movement   No VB, LOF or contractions baby is moving ok   C/O no complaints today   Phone number # [phone]  Pharmacy verified with patient  WT= 199 lbs             BP=98/70  3 hr glucose done today   HEATHER given today with instructions   tdap offered today. Pt states she would like to consult her  before and will let us know next visit.   Declines flu shot   BTL offered today

## 2017-11-17 DIAGNOSIS — O24.419 GESTATIONAL DIABETES MELLITUS (GDM), ANTEPARTUM, GESTATIONAL DIABETES METHOD OF CONTROL UNSPECIFIED: ICD-10-CM

## 2017-11-21 ENCOUNTER — TELEPHONE (OUTPATIENT)
Dept: OBGYN | Facility: CLINIC | Age: 42
End: 2017-11-21

## 2017-11-21 NOTE — TELEPHONE ENCOUNTER
----- Message from Bonita Mora D.N.P. sent at 11/17/2017  8:41 AM PST -----  Newly diagnosed GDM. To GDM class and clinic.  11/21/17@ 1:15pm. Patient notified regarding new diagnosis for GDM, diabetes education scheduled for 12/5/17.  F/u @ [facility] for diabetic clinic for  12/7/17. Patient informed to bring book and meter to each visit and that we will be collecting a UA sample every time.  Patient was not able to come for diabetic education 11/28/17 due to her sister having surgery that day.

## 2017-12-05 ENCOUNTER — NON-PROVIDER VISIT (OUTPATIENT)
Dept: HEALTH INFORMATION MANAGEMENT | Facility: MEDICAL CENTER | Age: 42
End: 2017-12-05

## 2017-12-05 VITALS
HEIGHT: 62 IN | HEART RATE: 76 BPM | BODY MASS INDEX: 36.64 KG/M2 | DIASTOLIC BLOOD PRESSURE: 59 MMHG | WEIGHT: 199.13 LBS | SYSTOLIC BLOOD PRESSURE: 108 MMHG

## 2017-12-05 DIAGNOSIS — O24.419 GESTATIONAL DIABETES MELLITUS (GDM) IN THIRD TRIMESTER, GESTATIONAL DIABETES METHOD OF CONTROL UNSPECIFIED: ICD-10-CM

## 2017-12-05 NOTE — PROGRESS NOTES
Silvia attended [place] Gestational Diabetes class . Pt was given and instructed on a One touch verio meter with 20 strips. Return demonstration was done with finger stick of 69, 3.5 hours pp. Less costly options for strips and meters discussed, which included CARE CHEST( she would need a prescription for this), and [employer]'s Reli-On meter($9.00) and strips ($9.00 for 50 strips). Pt states she is active at work ( works 7 hours a day, five days a week). Encouraged her to walk on her days off as well. Discussed what she needs to do after delivery for herself and family to limit risk for type two diabetes. All education and handouts given in Macedonian.

## 2017-12-05 NOTE — LETTER
December 5, 2017                   Re: Silvia Atkins     1975         [id number]       [facility]  [address] [place] [address]  [address] [place] [id number]      Dear :[facility], *    On 12/5/2017, your patient Silvia Atkins, received 1 hour  of diabetes education from the Diabetes Center at [facility] for management of her gestational diabetes.  Her EDC is  : 2/1/18.  We taught the following subjects:    Introduction to gestational diabetes, benefits and responsibilities of patient, physiology of diabetes and the diease process, benefits of blood glucose monitoring and record keeping, medication action and possible side effects, hypoglycemia, sick day management, exercise, stress reduction and travel with diabetes.       Nurse assessment / Education:    Comments:    BP:Blood Pressure: 108/59   Edema:No      Weight:Weight: 90.3 kg (199 lb 2 oz)         Complaints:No     Pathophysiology of diabetes in pregnancy    Discuss  potential maternal and fetal complications in pregnancy with diabetes.     Importance of blood glucose monitoring   Proper testing technique using a One Touch Verio meter.    At 0940, the meter read 69, which was 3.5 hours after eating.  Testing: fasting and one hour after meals,  expected ranges and rationale for strict control.   Urine ketone testing and rationale    Ketone testing:  [facility]    Ketone test today:no     Recognition and treatment of hypoglycemia.     Insulin taught: No  Insulin briefly dicussed at this time.    Should patient require insulin later in pregnancy, she would need further education.     Reviewed fetal kick counts and other tests to determine fetal well-being  Discuss benefits and risks of exercise in pregnancy  Discuss when to call Doctor  Discuss sick day care  Importance of wearing diabetes identification    Silvia attended [place] Gestational Diabetes class . Pt was given and instructed on a One touch verio meter  with 20 strips. Return demonstration was done with finger stick of 69, 3.5 hours pp. Less costly options for strips and meters discussed, which included CARE CHEST( she would need a prescription for this), and [facility]'s Reli-On meter($9.00) and strips ($9.00 for 50 strips). Pt states she is active at work ( works 7 hours a day, five days a week). Encouraged her to walk on her days off as well. Discussed what she needs to do after delivery for herself and family to limit risk for type two diabetes. All education and handouts given in Maltese.    Patient/caregiver appeared to understand the content as demonstrated by appropriate questions.     Silvia Atkins was encouraged to discuss this further with you.    Hopefully this will help in your management of her care.  If we can be of further assistance, please feel free to call.    Thank you for the referral.    Sincerely,    Amarilis Kathleen RN CDE  Certified Diabetes Educator

## 2017-12-05 NOTE — LETTER
December 5, 2017                   Re: Silvia Atkins     1975         [id number]       [facility] M[facility]  [address] [facility] [address]  [address] [place] [address]      Dear [facility],     On 12/5/2017, your patient Silvia Atkins, received 2 hours of nutrition training from the Diabetes Center at [facility] for management of her gestational diabetes.  Her EDC is Estimated Date of Delivery: 2/1/18.  We taught the following subjects:    Patient provided 1800 calorie meal plan with 3 meals and 3 snacks.   181 grams carbohydrate,   119 grams protein,   77 grams fat  Importance of meal planning in diabetes management during pregnancy  Importance of consistent timing of meals and snacks and agreed upon times  Avoidance of simple carbohydrates  Metabolism of food components relating to pregnancy  Identification of foods in food groups  Patient demonstrates adequate ability to utilize meal planning manual for reference  Plan 3 meals and 3 snacks with 90% accuracy  Review basic principles of eating out  Reviewed precautions with artificial sweeteners  Comments:  Silvia agreed to follow the meal plan and to eat at the times agreed upon.  She will check blood glucose 4 times a day and record the values in her log book.  She will follow up at [facility] on December 7, 2017.    Hopefully this will help in your management of her care.  If we can be of further assistance, please feel free to call.    Thank you for the referral.    Sincerely,  Kimberly Pan RD, CDE  Certified Diabetes Educator

## 2017-12-07 ENCOUNTER — ROUTINE PRENATAL (OUTPATIENT)
Dept: OBGYN | Facility: CLINIC | Age: 42
End: 2017-12-07

## 2017-12-07 VITALS — SYSTOLIC BLOOD PRESSURE: 102 MMHG | WEIGHT: 200 LBS | BODY MASS INDEX: 36.58 KG/M2 | DIASTOLIC BLOOD PRESSURE: 60 MMHG

## 2017-12-07 DIAGNOSIS — O24.410 DIET CONTROLLED GESTATIONAL DIABETES MELLITUS (GDM) IN THIRD TRIMESTER: ICD-10-CM

## 2017-12-07 LAB
APPEARANCE UR: NORMAL
BILIRUB UR STRIP-MCNC: NORMAL MG/DL
COLOR UR AUTO: NORMAL
GLUCOSE UR STRIP.AUTO-MCNC: NEGATIVE MG/DL
KETONES UR STRIP.AUTO-MCNC: NEGATIVE MG/DL
LEUKOCYTE ESTERASE UR QL STRIP.AUTO: NEGATIVE
NITRITE UR QL STRIP.AUTO: NEGATIVE
PH UR STRIP.AUTO: 6.5 [PH] (ref 5–8)
PROT UR QL STRIP: NEGATIVE MG/DL
RBC UR QL AUTO: NEGATIVE
SP GR UR STRIP.AUTO: 1.01
UROBILINOGEN UR STRIP-MCNC: NORMAL MG/DL

## 2017-12-07 PROCEDURE — 90040 PR PRENATAL FOLLOW UP: CPT | Performed by: OBSTETRICS & GYNECOLOGY

## 2017-12-07 PROCEDURE — 90715 TDAP VACCINE 7 YRS/> IM: CPT | Performed by: OBSTETRICS & GYNECOLOGY

## 2017-12-07 PROCEDURE — 90471 IMMUNIZATION ADMIN: CPT | Performed by: OBSTETRICS & GYNECOLOGY

## 2017-12-07 PROCEDURE — 81002 URINALYSIS NONAUTO W/O SCOPE: CPT | Performed by: OBSTETRICS & GYNECOLOGY

## 2017-12-07 NOTE — PROGRESS NOTES
Pt here today for OB follow up / Diabetic  Reports +FM  WT: 200 lb  BP: 102/60  Pt states she has been going through a lot of stress and having nosebleeds everyday for the past 2 weeks.   Desires Tdap vaccine  BTL discussed today. Pt declines.  Pt states she forgot to bring her log book. Pt was instructed to bring her book and meter at her next appt.   Nacho # [phone]    Tdap vaccine given. Right Deltoid. VIS given and screening check list reviewed with pt.

## 2017-12-07 NOTE — LETTER
December 7, 2017       Patient: Silvia Atkins   YOB: 1975   Date of Visit: 12/7/2017         To Whom It May Concern:          It is my medical opinion that Silvia Atkins may need to take days off from work intermittently due to complications in pregnancy.    If you have any questions or concerns, please don't hesitate to call [phone]          Sincerely,          Mireya Lara M.D.  Electronically Signed

## 2017-12-07 NOTE — PROGRESS NOTES
Silvia Atkins 42 y.o.  32w0d here today for obstetrical visit. Patient reports good  fetal movement. denies contractions, denies vaginal bleeding, denies loss of fluid.  Patient states she is having occasional nosebleeds which require her to leave work    Pregnancy is complicated by   Patient Active Problem List    Diagnosis Date Noted   • Gestational diabetes 2017   • Elevated glucose tolerance test 11/10/2017   • Advanced maternal age in multigravida 2017   • History of bilateral tubal ligation 2017   • History of reversal of tubal ligation 2017   • History of precipitous delivery 2017       GBS not done  Fasting blood sugars range patient did not bring her glucose log  Postprandial blood sugars range     Patient was asking for a note to claim that she was disabled due to fatigue related to pregnancy and her age. I discussed with her that she is not technically disabled because of pregnancy, however I can write her a note for intermittent absences from work    Insulin regimen  NPH a.m. 0 , regular a.m.0  Regular with dinner 0  NPH at bedtime 0     Followup in 1 weeks   labor precautions are reviewed  Continue kick counts daily after 28 weeks  NST twice weekly after 32 weeks if on insulin

## 2017-12-14 ENCOUNTER — HOSPITAL ENCOUNTER (OUTPATIENT)
Dept: LAB | Facility: MEDICAL CENTER | Age: 42
End: 2017-12-14
Attending: OBSTETRICS & GYNECOLOGY
Payer: COMMERCIAL

## 2017-12-14 ENCOUNTER — ROUTINE PRENATAL (OUTPATIENT)
Dept: OBGYN | Facility: CLINIC | Age: 42
End: 2017-12-14

## 2017-12-14 VITALS
WEIGHT: 202 LBS | DIASTOLIC BLOOD PRESSURE: 64 MMHG | BODY MASS INDEX: 36.95 KG/M2 | SYSTOLIC BLOOD PRESSURE: 102 MMHG | TEMPERATURE: 98.1 F

## 2017-12-14 DIAGNOSIS — O24.410 DIET CONTROLLED GESTATIONAL DIABETES MELLITUS (GDM) IN THIRD TRIMESTER: ICD-10-CM

## 2017-12-14 DIAGNOSIS — O24.410 GDM, CLASS A1: ICD-10-CM

## 2017-12-14 LAB
APPEARANCE UR: NORMAL
BILIRUB UR STRIP-MCNC: NORMAL MG/DL
COLOR UR AUTO: NORMAL
EST. AVERAGE GLUCOSE BLD GHB EST-MCNC: 111 MG/DL
GLUCOSE UR STRIP.AUTO-MCNC: NEGATIVE MG/DL
HBA1C MFR BLD: 5.5 % (ref 0–5.6)
KETONES UR STRIP.AUTO-MCNC: NEGATIVE MG/DL
LEUKOCYTE ESTERASE UR QL STRIP.AUTO: NEGATIVE
NITRITE UR QL STRIP.AUTO: NEGATIVE
PH UR STRIP.AUTO: 5 [PH] (ref 5–8)
PROT UR QL STRIP: NEGATIVE MG/DL
RBC UR QL AUTO: NEGATIVE
SP GR UR STRIP.AUTO: 1
UROBILINOGEN UR STRIP-MCNC: NORMAL MG/DL

## 2017-12-14 PROCEDURE — 90040 PR PRENATAL FOLLOW UP: CPT | Performed by: OBSTETRICS & GYNECOLOGY

## 2017-12-14 PROCEDURE — 81002 URINALYSIS NONAUTO W/O SCOPE: CPT | Performed by: OBSTETRICS & GYNECOLOGY

## 2017-12-14 RX ORDER — ACETAMINOPHEN 500 MG
500-1000 TABLET ORAL EVERY 6 HOURS PRN
Status: ON HOLD | COMMUNITY
End: 2018-01-29

## 2017-12-14 RX ORDER — DEXTROMETHORPHAN HBR. AND GUAIFENESIN 10; 100 MG/5ML; MG/5ML
10 SOLUTION ORAL EVERY 4 HOURS PRN
Status: ON HOLD | COMMUNITY
End: 2018-01-29

## 2017-12-14 NOTE — NON-PROVIDER
Insulin instruction given to patient on 12/14/17. Patient will start 5 units of NPH, QHS. Patient instructed how to draw up insulin,site selection and rotation, self injection technique, proper storage of insulin and disposal of syringes. Patient demonstrated back self injection technique successfully. Advised to follow really close her meal plan. Will call back in case of questions or problems.  1 vial of NPH given to patient  Lot# [id number]   Exp date  1/2020

## 2017-12-14 NOTE — PROGRESS NOTES
S: Doing well. (+) cough, itchy throat for 3 weeks, no fever  positive fetal movement.  negative vaginal bleeding.  negative leakage of fluid.  negative contractions.  negative headache. negative nausea/vomiting.  negative RUQ pain.  negative vision changes.  Reviewed blood sugar log with patient.  Reviewed diet.  Questions answered.    O: VSS Afeb    FBS range:elevated       T- 99    1 hour post prandial range:        No TP congestion   Random accucheck: 71       CTAB    A/P:  42 y.o.  33w0d by last menstrual period which is consistent with 1st trimester ultrasound with A2GDM who presents for obstetric follow-up.    1.  Labs: HgbA1C= ordered  2.  Start insulin dosage as follows:       QHS: 5NPH       Insulin teaching  3. NSTs: 2x/week  4. Continue prenatal vitamins.  5. Continue fetal kick counts   6.  Watch weight gain.  7.  Increase water intake.  8.  Encouraged prenatal classes.  9.  Vial of insulin given.  10.  Follow-up in 1 week for obsetric follow-up.

## 2017-12-14 NOTE — PROGRESS NOTES
Pt here today for OB follow up/GDM  Reports +FM  C/o high fevers and cough for 2 weeks she is taking tylenol and tussin DM  Pt has not done HgbA1c because she did not have lab slip, lab slip reprinted  Good # [phone]

## 2017-12-21 ENCOUNTER — ROUTINE PRENATAL (OUTPATIENT)
Dept: OBGYN | Facility: CLINIC | Age: 42
End: 2017-12-21

## 2017-12-21 VITALS — SYSTOLIC BLOOD PRESSURE: 110 MMHG | BODY MASS INDEX: 36.95 KG/M2 | DIASTOLIC BLOOD PRESSURE: 64 MMHG | WEIGHT: 202 LBS

## 2017-12-21 DIAGNOSIS — O24.419 GDM, CLASS A2: ICD-10-CM

## 2017-12-21 LAB
APPEARANCE UR: NORMAL
BILIRUB UR STRIP-MCNC: NORMAL MG/DL
COLOR UR AUTO: NORMAL
GLUCOSE UR STRIP.AUTO-MCNC: NEGATIVE MG/DL
KETONES UR STRIP.AUTO-MCNC: 1 MG/DL
LEUKOCYTE ESTERASE UR QL STRIP.AUTO: NEGATIVE
NITRITE UR QL STRIP.AUTO: NEGATIVE
NST ACOUSTIC STIMULATION: YES
NST ACTION NECESSARY: NORMAL
NST ASSESSMENT: NORMAL
NST BASELINE: 130
NST INDICATIONS: NORMAL
NST OTHER DATA: NORMAL
NST READ BY: NORMAL
NST RETURN: NORMAL
NST UTERINE ACTIVITY: NORMAL
PH UR STRIP.AUTO: 6.5 [PH] (ref 5–8)
PROT UR QL STRIP: NEGATIVE MG/DL
RBC UR QL AUTO: NEGATIVE
SP GR UR STRIP.AUTO: 1
UROBILINOGEN UR STRIP-MCNC: NORMAL MG/DL

## 2017-12-21 PROCEDURE — 81002 URINALYSIS NONAUTO W/O SCOPE: CPT | Performed by: OBSTETRICS & GYNECOLOGY

## 2017-12-21 PROCEDURE — 90040 PR PRENATAL FOLLOW UP: CPT | Performed by: OBSTETRICS & GYNECOLOGY

## 2017-12-21 PROCEDURE — 59025 FETAL NON-STRESS TEST: CPT | Performed by: NURSE PRACTITIONER

## 2017-12-21 NOTE — PROGRESS NOTES
GDM Class A 1  . OB F/U.  + fetal movement  Denies any VB, LO or UC's  Phone # [phone]  Pharmacy confirmed  Diabetic supplies:no  C/o feeling dizzy a lot during the day at work.

## 2017-12-21 NOTE — PROGRESS NOTES
Diabetic prenatal visit;    34w0d  Patient Active Problem List    Diagnosis Date Noted   • Gestational diabetes - A2 11/17/2017   • Advanced maternal age in multigravida 08/04/2017   • History of bilateral tubal ligation 08/04/2017   • History of reversal of tubal ligation 08/04/2017   • History of precipitous delivery 08/04/2017       The patient presents for prenatal care. She is doing well. Denies contractions leakage of fluid or vaginal bleeding. Having good fetal movement    Vitals:    12/21/17 0910   BP: 110/64   Weight: 91.6 kg (202 lb)       Hemoglobin A1C;5.5    Fasting sugars; low 100s  One hour Postprandial sugars; under 130    Size equals dates, normal fetal heart rate      Continue ADA diet      New insulin regimen;   Morning; zero  Dinnertime; zero  Bedtime; 15 units NPH    NSTs twice weekly starting at 32 weeks if class AII GDM or worse  Increase fluid hydration to 2 L per day  Discussed proper shoes to be worn her pregnancy  Increase exercise daily walking 30 minutes per day  Delivery by 39 weeks if class AII GDM or worse  Discussed support hose use during pregnancy    Followup; one weeks

## 2017-12-21 NOTE — PROGRESS NOTES
S: Pt is a 42 y.o.  at 34w0d with  Estimated Date of Delivery: 18 who presents for scheduled NST for GDM A2. No complaints.  Denies VB, RUCs, LOF.  Reports good FM.      O: LMP 2017          FHTs: baseline 130, accels positive,  decels negative,  Variability moderate. VAS during exam       [place]: irregular UCs           A/P  Patient Active Problem List    Diagnosis Date Noted   • Gestational diabetes - A2 2017   • Advanced maternal age in multigravida 2017   • History of bilateral tubal ligation 2017   • History of reversal of tubal ligation 2017   • History of precipitous delivery 2017       1.  IUP @ 34w0d  2.  Reactive, category 1 NST.  3.  F/u as sched.  4.  Continue 2x weekly NST's

## 2017-12-21 NOTE — LETTER
December 21, 2017       Patient: Silvia Atkins   YOB: 1975   Date of Visit: 12/21/2017         To Whom It May Concern:      It is my medical opinion that Silvia Atkins desires to start her FMLA for maternity leave as of 12/26/17.    If you have any questions or concerns, please don't hesitate to call [phone]          Sincerely,          Randy Ralph M.D.  Electronically Signed

## 2017-12-26 ENCOUNTER — ROUTINE PRENATAL (OUTPATIENT)
Dept: OBGYN | Facility: CLINIC | Age: 42
End: 2017-12-26

## 2017-12-26 DIAGNOSIS — O24.419 GDM, CLASS A2: ICD-10-CM

## 2017-12-26 LAB
NST ACOUSTIC STIMULATION: NORMAL
NST ACTION NECESSARY: NORMAL
NST ASSESSMENT: REACTIVE
NST BASELINE: 130
NST INDICATIONS: NORMAL
NST OTHER DATA: NORMAL
NST READ BY: NORMAL
NST RETURN: NORMAL
NST UTERINE ACTIVITY: NORMAL

## 2017-12-26 PROCEDURE — 59025 FETAL NON-STRESS TEST: CPT | Performed by: OBSTETRICS & GYNECOLOGY

## 2017-12-28 ENCOUNTER — ROUTINE PRENATAL (OUTPATIENT)
Dept: OBGYN | Facility: CLINIC | Age: 42
End: 2017-12-28

## 2017-12-28 ENCOUNTER — APPOINTMENT (OUTPATIENT)
Dept: RADIOLOGY | Facility: MEDICAL CENTER | Age: 42
End: 2017-12-28
Attending: OBSTETRICS & GYNECOLOGY
Payer: COMMERCIAL

## 2017-12-28 ENCOUNTER — HOSPITAL ENCOUNTER (OUTPATIENT)
Facility: MEDICAL CENTER | Age: 42
End: 2017-12-28
Attending: OBSTETRICS & GYNECOLOGY | Admitting: OBSTETRICS & GYNECOLOGY
Payer: COMMERCIAL

## 2017-12-28 VITALS
TEMPERATURE: 97.2 F | SYSTOLIC BLOOD PRESSURE: 109 MMHG | DIASTOLIC BLOOD PRESSURE: 65 MMHG | BODY MASS INDEX: 37.73 KG/M2 | HEART RATE: 78 BPM | WEIGHT: 205 LBS | HEIGHT: 62 IN

## 2017-12-28 VITALS — BODY MASS INDEX: 37.68 KG/M2 | WEIGHT: 206 LBS | SYSTOLIC BLOOD PRESSURE: 106 MMHG | DIASTOLIC BLOOD PRESSURE: 62 MMHG

## 2017-12-28 DIAGNOSIS — O24.419 GDM, CLASS A2: ICD-10-CM

## 2017-12-28 LAB
APPEARANCE UR: NORMAL
BILIRUB UR STRIP-MCNC: NORMAL MG/DL
COLOR UR AUTO: NORMAL
GLUCOSE UR STRIP.AUTO-MCNC: NORMAL MG/DL
KETONES UR STRIP.AUTO-MCNC: NORMAL MG/DL
LEUKOCYTE ESTERASE UR QL STRIP.AUTO: NORMAL
NITRITE UR QL STRIP.AUTO: NORMAL
NST ACOUSTIC STIMULATION: YES
NST ACTION NECESSARY: ABNORMAL
NST ASSESSMENT: NONREACTIVE
NST BASELINE: 140
NST INDICATIONS: ABNORMAL
NST OTHER DATA: ABNORMAL
NST READ BY: ABNORMAL
NST RETURN: ABNORMAL
NST UTERINE ACTIVITY: ABNORMAL
PH UR STRIP.AUTO: 6.5 [PH] (ref 5–8)
PROT UR QL STRIP: NORMAL MG/DL
RBC UR QL AUTO: NORMAL
SP GR UR STRIP.AUTO: 1
UROBILINOGEN UR STRIP-MCNC: NORMAL MG/DL

## 2017-12-28 PROCEDURE — 81002 URINALYSIS NONAUTO W/O SCOPE: CPT | Performed by: OBSTETRICS & GYNECOLOGY

## 2017-12-28 PROCEDURE — 76819 FETAL BIOPHYS PROFIL W/O NST: CPT

## 2017-12-28 PROCEDURE — 90040 PR PRENATAL FOLLOW UP: CPT | Performed by: OBSTETRICS & GYNECOLOGY

## 2017-12-28 PROCEDURE — 59025 FETAL NON-STRESS TEST: CPT | Performed by: OBSTETRICS & GYNECOLOGY

## 2017-12-28 NOTE — PROGRESS NOTES
NST nonreactive-needs biophysical profile-sent to labor and delivery    Report given to triage nurse Devin ABEBE

## 2017-12-28 NOTE — PROGRESS NOTES
42yp  edc , 35 presents from [facility] for BPP or reactive NST. Pt was at TP and did not have a reactive NST per Dr. Ralph. Pt given cold water and accoustic stim at TP. Pt is an A2 diabetic. Sent over here for extended monitoring and/or BPP. Pt states she has felt the baby move today but not as much. Denies UCs, LOF or vag bleeding. Pt last ate at 0800. EFM and [place] placed. Pt wedged to left side.  Dr. Lara updated. BPP ordered.  1200 US here for BPP  1240 BPP . Dr. Kirkland notified. Okay to discharge to home.   1300  Discharge instructions given, pt states understanding. Reactive strip obtained. Pt discharged to home  in stable condition, ambulatory, with family.

## 2017-12-28 NOTE — PROGRESS NOTES
Diabetic prenatal visit;    35w0d  Patient Active Problem List    Diagnosis Date Noted   • Gestational diabetes - A2 11/17/2017   • Advanced maternal age in multigravida 08/04/2017   • History of bilateral tubal ligation 08/04/2017   • History of reversal of tubal ligation 08/04/2017   • History of precipitous delivery 08/04/2017       The patient presents for prenatal care. She is doing well. Denies contractions leakage of fluid or vaginal bleeding. Having good fetal movement    Vitals:    12/28/17 0950   BP: 106/62   Weight: 93.4 kg (206 lb)       Hemoglobin A1C;5.5    Fasting sugars; mid to high 90s  One hour Postprandial sugars; under 120    Size equals dates, normal fetal heart rate      Continue ADA diet      New insulin regimen;   Morning; zero  Dinnertime; zero  Bedtime; 18 units NPH    NSTs twice weekly starting at 32 weeks if class AII GDM or worse  Increase fluid hydration to 2 L per day  Discussed proper shoes to be worn her pregnancy  Increase exercise daily walking 30 minutes per day  Delivery by 39 weeks if class AII GDM or worse  Discussed support hose use during pregnancy    NST nonreactive-sent to labor and delivery for biophysical profile    Followup; one weeks

## 2017-12-28 NOTE — PROGRESS NOTES
Pt. here for Ob f/u, GBS and NST today. Good # [phone]  Good FM  Pt states no complaints.   Pharmacy verified.

## 2018-01-02 ENCOUNTER — ROUTINE PRENATAL (OUTPATIENT)
Dept: OBGYN | Facility: CLINIC | Age: 43
End: 2018-01-02

## 2018-01-02 DIAGNOSIS — O24.419 GDM, CLASS A2: ICD-10-CM

## 2018-01-02 LAB
NST ACOUSTIC STIMULATION: NO
NST ACTION NECESSARY: NORMAL
NST ASSESSMENT: NORMAL
NST BASELINE: 130
NST INDICATIONS: NORMAL
NST OTHER DATA: NORMAL
NST READ BY: NORMAL
NST RETURN: NORMAL
NST UTERINE ACTIVITY: NO

## 2018-01-02 PROCEDURE — 59025 FETAL NON-STRESS TEST: CPT | Performed by: OBSTETRICS & GYNECOLOGY

## 2018-01-04 ENCOUNTER — ROUTINE PRENATAL (OUTPATIENT)
Dept: OBGYN | Facility: CLINIC | Age: 43
End: 2018-01-04

## 2018-01-04 ENCOUNTER — HOSPITAL ENCOUNTER (OUTPATIENT)
Facility: MEDICAL CENTER | Age: 43
End: 2018-01-04
Attending: OBSTETRICS & GYNECOLOGY
Payer: COMMERCIAL

## 2018-01-04 VITALS — WEIGHT: 205 LBS | BODY MASS INDEX: 37.49 KG/M2 | DIASTOLIC BLOOD PRESSURE: 58 MMHG | SYSTOLIC BLOOD PRESSURE: 103 MMHG

## 2018-01-04 DIAGNOSIS — O24.414 INSULIN CONTROLLED GESTATIONAL DIABETES MELLITUS (GDM) IN THIRD TRIMESTER: ICD-10-CM

## 2018-01-04 DIAGNOSIS — O24.419 GDM, CLASS A2: ICD-10-CM

## 2018-01-04 LAB
APPEARANCE UR: NORMAL
BILIRUB UR STRIP-MCNC: NORMAL MG/DL
COLOR UR AUTO: NORMAL
GLUCOSE UR STRIP.AUTO-MCNC: NEGATIVE MG/DL
KETONES UR STRIP.AUTO-MCNC: NEGATIVE MG/DL
LEUKOCYTE ESTERASE UR QL STRIP.AUTO: NEGATIVE
NITRITE UR QL STRIP.AUTO: NEGATIVE
NST ACOUSTIC STIMULATION: NORMAL
NST ACTION NECESSARY: NORMAL
NST ASSESSMENT: NORMAL
NST BASELINE: NORMAL
NST INDICATIONS: NORMAL
NST OTHER DATA: NORMAL
NST READ BY: NORMAL
NST RETURN: NORMAL
NST UTERINE ACTIVITY: NORMAL
PH UR STRIP.AUTO: 6 [PH] (ref 5–8)
PROT UR QL STRIP: NORMAL MG/DL
RBC UR QL AUTO: NEGATIVE
SP GR UR STRIP.AUTO: 1.01
UROBILINOGEN UR STRIP-MCNC: NORMAL MG/DL

## 2018-01-04 PROCEDURE — 59025 FETAL NON-STRESS TEST: CPT | Performed by: OBSTETRICS & GYNECOLOGY

## 2018-01-04 PROCEDURE — 90040 PR PRENATAL FOLLOW UP: CPT | Performed by: OBSTETRICS & GYNECOLOGY

## 2018-01-04 PROCEDURE — 81002 URINALYSIS NONAUTO W/O SCOPE: CPT | Performed by: OBSTETRICS & GYNECOLOGY

## 2018-01-04 NOTE — PROGRESS NOTES
Silvia Gallagherantes 42 y.o.  36w0d here today for obstetrical visit. Patient reports good  fetal movement. denies contractions, denies vaginal bleeding, denies loss of fluid.    Pregnancy is complicated by   Patient Active Problem List    Diagnosis Date Noted   • Gestational diabetes - A2 2017   • Advanced maternal age in multigravida 2017   • History of bilateral tubal ligation 2017   • History of reversal of tubal ligation 2017   • History of precipitous delivery 2017       GBS done  Fasting blood sugars range 90-98  Postprandial blood sugars range <120    Insulin regimen  NPH at bedtime 18 units    Will increase to NPH 20 units at bedtime.  IOL order placed for 39 wks.      Followup in 1 week   labor precautions are reviewed  Continue kick counts daily after 28 weeks  NST twice weekly after 32 weeks if on insulin

## 2018-01-04 NOTE — PROGRESS NOTES
OB f/u- A2 GDM   + fetal movement.  No VB, LOF or UC's.  Good phone # [phone]  Preferred pharmacy confirmed.  GBS collected today  Pt reports no problems at this time

## 2018-01-05 LAB — GP B STREP DNA SPEC QL NAA+PROBE: NEGATIVE

## 2018-01-08 ENCOUNTER — ROUTINE PRENATAL (OUTPATIENT)
Dept: OBGYN | Facility: CLINIC | Age: 43
End: 2018-01-08

## 2018-01-08 DIAGNOSIS — O24.419 GDM, CLASS A2: ICD-10-CM

## 2018-01-08 LAB
NST ACOUSTIC STIMULATION: NORMAL
NST ACTION NECESSARY: NORMAL
NST ASSESSMENT: NORMAL
NST BASELINE: NORMAL
NST INDICATIONS: NORMAL
NST OTHER DATA: NORMAL
NST READ BY: NORMAL
NST RETURN: NORMAL
NST UTERINE ACTIVITY: NORMAL

## 2018-01-08 PROCEDURE — 59025 FETAL NON-STRESS TEST: CPT | Performed by: OBSTETRICS & GYNECOLOGY

## 2018-01-11 ENCOUNTER — ROUTINE PRENATAL (OUTPATIENT)
Dept: OBGYN | Facility: CLINIC | Age: 43
End: 2018-01-11

## 2018-01-11 VITALS — WEIGHT: 206 LBS | DIASTOLIC BLOOD PRESSURE: 56 MMHG | SYSTOLIC BLOOD PRESSURE: 96 MMHG | BODY MASS INDEX: 37.68 KG/M2

## 2018-01-11 DIAGNOSIS — O24.419 GDM, CLASS A2: ICD-10-CM

## 2018-01-11 LAB
APPEARANCE UR: NORMAL
BILIRUB UR STRIP-MCNC: NORMAL MG/DL
COLOR UR AUTO: NORMAL
GLUCOSE UR STRIP.AUTO-MCNC: NORMAL MG/DL
KETONES UR STRIP.AUTO-MCNC: NORMAL MG/DL
LEUKOCYTE ESTERASE UR QL STRIP.AUTO: NORMAL
NITRITE UR QL STRIP.AUTO: NORMAL
NST ACOUSTIC STIMULATION: NORMAL
NST ACTION NECESSARY: NORMAL
NST ASSESSMENT: REACTIVE
NST BASELINE: 135
NST INDICATIONS: NORMAL
NST OTHER DATA: NORMAL
NST READ BY: NORMAL
NST RETURN: NORMAL
NST UTERINE ACTIVITY: NORMAL
PH UR STRIP.AUTO: 7 [PH] (ref 5–8)
PROT UR QL STRIP: NORMAL MG/DL
RBC UR QL AUTO: NORMAL
SP GR UR STRIP.AUTO: 1.01
UROBILINOGEN UR STRIP-MCNC: NORMAL MG/DL

## 2018-01-11 PROCEDURE — 81002 URINALYSIS NONAUTO W/O SCOPE: CPT | Performed by: OBSTETRICS & GYNECOLOGY

## 2018-01-11 PROCEDURE — 90040 PR PRENATAL FOLLOW UP: CPT | Performed by: OBSTETRICS & GYNECOLOGY

## 2018-01-11 PROCEDURE — 59025 FETAL NON-STRESS TEST: CPT | Performed by: OBSTETRICS & GYNECOLOGY

## 2018-01-11 NOTE — PROGRESS NOTES
Pt here today for OB follow up  Pt states no complaints   Reports +FM  Good # [phone]  Pharmacy Confirmed.  GBS negative  Patient is scheduled for IOL on 01/25/18 at 8am. Instructions given.

## 2018-01-11 NOTE — PROGRESS NOTES
S: Pt here for OB follow up. Doing well.   positive fetal movement.    negative vaginal bleeding.    negative leakage of fluid.    negative contractions.    Reviewed blood sugar log with patient.  Reviewed diet.  Questions answered.    O: VSS Afeb      See prenatal vitals, chart for today's exam    FBS range: 86 to 90, past 4d all < 90  1hr post prandial range: all < 110    Insulin regimen  NPH at bedtime 20       A/P:  42 y.o.  37w0d with A2 GDM who presents for obstetric follow-up.  GBS neg    1.  Labor precautions and fetal kick counts educated  2.  Change insulin dosage as follows:          NO CHANGES  3.  NSTs: 2x/week  4.  Continue prenatal vitamins.  5.  Watch weight gain.  Exercise at least 30 minutes daily  6.  Increase water intake.  7.  Encouraged prenatal classes.  8.  Follow-up in 1 weeks for obstetric follow-up.  9.  IOL 18 at 8am

## 2018-01-15 ENCOUNTER — ROUTINE PRENATAL (OUTPATIENT)
Dept: OBGYN | Facility: CLINIC | Age: 43
End: 2018-01-15
Payer: MEDICAID

## 2018-01-15 DIAGNOSIS — O24.419 GDM, CLASS A2: ICD-10-CM

## 2018-01-15 LAB
NST ACOUSTIC STIMULATION: YES
NST ACTION NECESSARY: NORMAL
NST ASSESSMENT: REACTIVE
NST BASELINE: 140
NST INDICATIONS: NORMAL
NST OTHER DATA: NORMAL
NST READ BY: NORMAL
NST RETURN: NORMAL
NST UTERINE ACTIVITY: NO

## 2018-01-15 PROCEDURE — 59025 FETAL NON-STRESS TEST: CPT | Performed by: OBSTETRICS & GYNECOLOGY

## 2018-01-18 ENCOUNTER — ROUTINE PRENATAL (OUTPATIENT)
Dept: OBGYN | Facility: CLINIC | Age: 43
End: 2018-01-18

## 2018-01-18 VITALS — BODY MASS INDEX: 38.04 KG/M2 | SYSTOLIC BLOOD PRESSURE: 102 MMHG | WEIGHT: 208 LBS | DIASTOLIC BLOOD PRESSURE: 67 MMHG

## 2018-01-18 DIAGNOSIS — O24.419 GDM, CLASS A2: ICD-10-CM

## 2018-01-18 DIAGNOSIS — O24.419 GESTATIONAL DIABETES MELLITUS (GDM), ANTEPARTUM, GESTATIONAL DIABETES METHOD OF CONTROL UNSPECIFIED: ICD-10-CM

## 2018-01-18 LAB
APPEARANCE UR: NORMAL
BILIRUB UR STRIP-MCNC: NORMAL MG/DL
COLOR UR AUTO: NORMAL
GLUCOSE UR STRIP.AUTO-MCNC: NORMAL MG/DL
KETONES UR STRIP.AUTO-MCNC: NORMAL MG/DL
LEUKOCYTE ESTERASE UR QL STRIP.AUTO: NORMAL
NITRITE UR QL STRIP.AUTO: NORMAL
NST ACOUSTIC STIMULATION: YES
NST ACTION NECESSARY: NORMAL
NST ASSESSMENT: NORMAL
NST BASELINE: 120
NST INDICATIONS: NORMAL
NST OTHER DATA: NORMAL
NST READ BY: NORMAL
NST RETURN: NORMAL
NST UTERINE ACTIVITY: NORMAL
PH UR STRIP.AUTO: 6.5 [PH] (ref 5–8)
PROT UR QL STRIP: NORMAL MG/DL
RBC UR QL AUTO: NORMAL
SP GR UR STRIP.AUTO: 1
UROBILINOGEN UR STRIP-MCNC: NORMAL MG/DL

## 2018-01-18 PROCEDURE — 81002 URINALYSIS NONAUTO W/O SCOPE: CPT | Performed by: OBSTETRICS & GYNECOLOGY

## 2018-01-18 PROCEDURE — 59025 FETAL NON-STRESS TEST: CPT | Performed by: OBSTETRICS & GYNECOLOGY

## 2018-01-18 PROCEDURE — 90040 PR PRENATAL FOLLOW UP: CPT | Performed by: OBSTETRICS & GYNECOLOGY

## 2018-01-18 NOTE — PROGRESS NOTES
Pt denies CTX, LOF, VB or any other c/o.  Good fetal movement.    Lab:  Recent Results (from the past 672 hour(s))   POCT Fetal Nonstress Test    Collection Time: 12/26/17  8:37 AM   Result Value Ref Range    NST Indications A2GDM     NST Baseline 130     NST Uterine Activity none     NST Acoustic Stimulation      NST Assessment reactive     NST Action Necessary      NST Other Data      NST Return 2 x a week     NST Read By     POCT Fetal Nonstress Test    Collection Time: 12/28/17  9:38 AM   Result Value Ref Range    NST Indications diabetes     NST Baseline 140     NST Uterine Activity none     NST Acoustic Stimulation yes     NST Assessment nonreactive     NST Action Necessary needs biophysical profile     NST Other Data      NST Return      NST Read By     POCT Urinalysis    Collection Time: 12/28/17 10:10 AM   Result Value Ref Range    POC Color  Negative    POC Appearance  Negative    POC Leukocyte Esterase NEG Negative    POC Nitrites NEG Negative    POC Urobiligen  Negative (0.2) mg/dL    POC Protein NEG Negative mg/dL    POC Urine PH 6.5 5.0 - 8.0    POC Blood NEG Negative    POC Specific Gravity 1.000 <1.005 - >1.030    POC Ketones NEG Negative mg/dL    POC [place]  Negative mg/dL    POC Glucose NEG Negative mg/dL   POCT Fetal Nonstress Test    Collection Time: 01/02/18  8:48 AM   Result Value Ref Range    NST Indications GDM     NST Baseline 130     NST Uterine Activity no     NST Acoustic Stimulation no     NST Assessment reactive, cat 1     NST Action Necessary      NST Other Data      NST Return      NST Read By     POCT Fetal Nonstress Test    Collection Time: 01/04/18  9:16 AM   Result Value Ref Range    NST Indications A2GDM     NST Baseline 130s     NST Uterine Activity none     NST Acoustic Stimulation      NST Assessment Cat 1     NST Action Necessary      NST Other Data      NST Return twice weekly     NST Read By [place]    POCT Urinalysis    Collection Time: 01/04/18  9:18 AM   Result Value  Ref Range    POC Color  Negative    POC Appearance  Negative    POC Leukocyte Esterase negative Negative    POC Nitrites negative Negative    POC Urobiligen  Negative (0.2) mg/dL    POC Protein trace Negative mg/dL    POC Urine PH 6.0 5.0 - 8.0    POC Blood negative Negative    POC Specific Gravity 1.010 <1.005 - >1.030    POC Ketones negative Negative mg/dL    POC [place]  Negative mg/dL    POC Glucose negative Negative mg/dL   GRP B STREP, BY PCR ([place])    Collection Time: 01/04/18 10:28 AM   Result Value Ref Range    Strep Gp B DNA PCR Negative Negative   POCT Fetal Nonstress Test    Collection Time: 01/08/18 10:02 AM   Result Value Ref Range    NST Indications A2GDM     NST Baseline 130s     NST Uterine Activity none     NST Acoustic Stimulation      NST Assessment Cat 1     NST Action Necessary      NST Other Data      NST Return twice weekly     NST Read By [place]    POCT Fetal Nonstress Test    Collection Time: 01/11/18  9:07 AM   Result Value Ref Range    NST Indications A2 GDM     NST Baseline 135     NST Uterine Activity none     NST Acoustic Stimulation n/a     NST Assessment reactive     NST Action Necessary f/u 3-4d     NST Other Data      NST Return      NST Read By     POCT Urinalysis    Collection Time: 01/11/18  9:14 AM   Result Value Ref Range    POC Color  Negative    POC Appearance  Negative    POC Leukocyte Esterase Neg Negative    POC Nitrites Neg Negative    POC Urobiligen  Negative (0.2) mg/dL    POC Protein Neg Negative mg/dL    POC Urine PH 7.0 5.0 - 8.0    POC Blood Neg Negative    POC Specific Gravity 1.010 <1.005 - >1.030    POC Ketones Neg Negative mg/dL    POC [place]  Negative mg/dL    POC Glucose Neg Negative mg/dL   POCT Fetal Nonstress Test    Collection Time: 01/15/18 10:09 AM   Result Value Ref Range    NST Indications gdm     NST Baseline 140     NST Uterine Activity no     NST Acoustic Stimulation yes     NST Assessment reactive     NST Action Necessary       NST Other Data      NST Return      NST Read By     POCT Urinalysis    Collection Time: 18 10:00 AM   Result Value Ref Range    POC Color  Negative    POC Appearance  Negative    POC Leukocyte Esterase Neg Negative    POC Nitrites Neg Negative    POC Urobiligen  Negative (0.2) mg/dL    POC Protein Neg Negative mg/dL    POC Urine PH 6.5 5.0 - 8.0    POC Blood Neg Negative    POC Specific Gravity 1.005 <1.005 - >1.030    POC Ketones Neg Negative mg/dL    POC [place]  Negative mg/dL    POC Glucose Neg Negative mg/dL   POCT Fetal Nonstress Test    Collection Time: 18 10:22 AM   Result Value Ref Range    NST Indications GDM     NST Baseline 120     NST Uterine Activity 1 CTX     NST Acoustic Stimulation yes     NST Assessment reactive, cat 1     NST Action Necessary      NST Other Data      NST Return      NST Read By         A/P:  42 y.o.  at 38w0d presents for obstetric follow-up. A2GDM - doing well. IOL in 1 week    1.  Continue prenatal vitamins.  2.  Begin/continue kick counts at 28 weeks   3.  Watch weight gain.  4.  Increase water intake.  5.  Follow-up on Monday for NST  6.  PTL/labor precautions given

## 2018-01-18 NOTE — PROGRESS NOTES
Pt here today for OB follow up  Pt states no complaints   Reports +  Good # [phone]  Pharmacy Confirmed.

## 2018-01-22 ENCOUNTER — ROUTINE PRENATAL (OUTPATIENT)
Dept: OBGYN | Facility: CLINIC | Age: 43
End: 2018-01-22

## 2018-01-22 DIAGNOSIS — O24.419 GDM, CLASS A2: ICD-10-CM

## 2018-01-22 PROCEDURE — 59025 FETAL NON-STRESS TEST: CPT | Performed by: OBSTETRICS & GYNECOLOGY

## 2018-01-25 ENCOUNTER — HOSPITAL ENCOUNTER (INPATIENT)
Facility: MEDICAL CENTER | Age: 43
LOS: 4 days | End: 2018-01-29
Attending: OBSTETRICS & GYNECOLOGY | Admitting: OBSTETRICS & GYNECOLOGY
Payer: MEDICAID

## 2018-01-25 LAB
BASOPHILS # BLD AUTO: 0.4 % (ref 0–1.8)
BASOPHILS # BLD: 0.04 K/UL (ref 0–0.12)
EOSINOPHIL # BLD AUTO: 0.12 K/UL (ref 0–0.51)
EOSINOPHIL NFR BLD: 1.2 % (ref 0–6.9)
ERYTHROCYTE [DISTWIDTH] IN BLOOD BY AUTOMATED COUNT: 42.7 FL (ref 35.9–50)
GLUCOSE BLD-MCNC: 78 MG/DL (ref 65–99)
HCT VFR BLD AUTO: 36.3 % (ref 37–47)
HGB BLD-MCNC: 11.8 G/DL (ref 12–16)
HOLDING TUBE BB 8507: NORMAL
IMM GRANULOCYTES # BLD AUTO: 0.05 K/UL (ref 0–0.11)
IMM GRANULOCYTES NFR BLD AUTO: 0.5 % (ref 0–0.9)
LYMPHOCYTES # BLD AUTO: 1.85 K/UL (ref 1–4.8)
LYMPHOCYTES NFR BLD: 18.4 % (ref 22–41)
MCH RBC QN AUTO: 28.2 PG (ref 27–33)
MCHC RBC AUTO-ENTMCNC: 32.5 G/DL (ref 33.6–35)
MCV RBC AUTO: 86.8 FL (ref 81.4–97.8)
MONOCYTES # BLD AUTO: 0.66 K/UL (ref 0–0.85)
MONOCYTES NFR BLD AUTO: 6.6 % (ref 0–13.4)
NEUTROPHILS # BLD AUTO: 7.32 K/UL (ref 2–7.15)
NEUTROPHILS NFR BLD: 72.9 % (ref 44–72)
NRBC # BLD AUTO: 0 K/UL
NRBC BLD-RTO: 0 /100 WBC
PLATELET # BLD AUTO: 181 K/UL (ref 164–446)
PMV BLD AUTO: 10.5 FL (ref 9–12.9)
RBC # BLD AUTO: 4.18 M/UL (ref 4.2–5.4)
WBC # BLD AUTO: 10 K/UL (ref 4.8–10.8)

## 2018-01-25 PROCEDURE — 700101 HCHG RX REV CODE 250: Performed by: OBSTETRICS & GYNECOLOGY

## 2018-01-25 PROCEDURE — 85025 COMPLETE CBC W/AUTO DIFF WBC: CPT

## 2018-01-25 PROCEDURE — 770002 HCHG ROOM/CARE - OB PRIVATE (112)

## 2018-01-25 PROCEDURE — 82962 GLUCOSE BLOOD TEST: CPT

## 2018-01-25 RX ORDER — ACETAMINOPHEN 325 MG/1
325 TABLET ORAL EVERY 4 HOURS PRN
Status: CANCELLED | OUTPATIENT
Start: 2018-01-25

## 2018-01-25 RX ORDER — ALUMINA, MAGNESIA, AND SIMETHICONE 2400; 2400; 240 MG/30ML; MG/30ML; MG/30ML
30 SUSPENSION ORAL EVERY 6 HOURS PRN
Status: DISCONTINUED | OUTPATIENT
Start: 2018-01-25 | End: 2018-01-26 | Stop reason: HOSPADM

## 2018-01-25 RX ORDER — METHYLERGONOVINE MALEATE 0.2 MG/ML
0.2 INJECTION INTRAVENOUS
Status: DISCONTINUED | OUTPATIENT
Start: 2018-01-25 | End: 2018-01-26 | Stop reason: HOSPADM

## 2018-01-25 RX ORDER — MISOPROSTOL 200 UG/1
800 TABLET ORAL
Status: DISCONTINUED | OUTPATIENT
Start: 2018-01-25 | End: 2018-01-26 | Stop reason: HOSPADM

## 2018-01-25 RX ORDER — SODIUM CHLORIDE, SODIUM LACTATE, POTASSIUM CHLORIDE, CALCIUM CHLORIDE 600; 310; 30; 20 MG/100ML; MG/100ML; MG/100ML; MG/100ML
INJECTION, SOLUTION INTRAVENOUS CONTINUOUS
Status: ACTIVE | OUTPATIENT
Start: 2018-01-25 | End: 2018-01-26

## 2018-01-25 RX ORDER — OXYCODONE HYDROCHLORIDE 10 MG/1
10 TABLET ORAL EVERY 4 HOURS PRN
Status: CANCELLED | OUTPATIENT
Start: 2018-01-25

## 2018-01-25 RX ORDER — ONDANSETRON 4 MG/1
4 TABLET, ORALLY DISINTEGRATING ORAL EVERY 6 HOURS PRN
OUTPATIENT
Start: 2018-01-25

## 2018-01-25 RX ORDER — IBUPROFEN 600 MG/1
600 TABLET ORAL EVERY 6 HOURS PRN
Status: CANCELLED | OUTPATIENT
Start: 2018-01-25

## 2018-01-25 RX ORDER — ONDANSETRON 2 MG/ML
4 INJECTION INTRAMUSCULAR; INTRAVENOUS EVERY 6 HOURS PRN
OUTPATIENT
Start: 2018-01-25

## 2018-01-25 RX ORDER — OXYCODONE HYDROCHLORIDE AND ACETAMINOPHEN 5; 325 MG/1; MG/1
1 TABLET ORAL EVERY 4 HOURS PRN
Status: CANCELLED | OUTPATIENT
Start: 2018-01-25

## 2018-01-25 RX ADMIN — DINOPROSTONE 5 MG: 20 SUPPOSITORY VAGINAL at 19:18

## 2018-01-25 ASSESSMENT — PAIN SCALES - GENERAL
PAINLEVEL_OUTOF10: 0

## 2018-01-26 LAB
GLUCOSE BLD-MCNC: 79 MG/DL (ref 65–99)
GLUCOSE BLD-MCNC: 82 MG/DL (ref 65–99)
GLUCOSE BLD-MCNC: 93 MG/DL (ref 65–99)

## 2018-01-26 PROCEDURE — 305385 HCHG SURGICAL SERVICES 1/4 HOUR

## 2018-01-26 PROCEDURE — 82962 GLUCOSE BLOOD TEST: CPT | Mod: 91

## 2018-01-26 PROCEDURE — 59514 CESAREAN DELIVERY ONLY: CPT

## 2018-01-26 PROCEDURE — 770002 HCHG ROOM/CARE - OB PRIVATE (112)

## 2018-01-26 PROCEDURE — 36415 COLL VENOUS BLD VENIPUNCTURE: CPT

## 2018-01-26 PROCEDURE — 700101 HCHG RX REV CODE 250

## 2018-01-26 PROCEDURE — 700111 HCHG RX REV CODE 636 W/ 250 OVERRIDE (IP): Performed by: STUDENT IN AN ORGANIZED HEALTH CARE EDUCATION/TRAINING PROGRAM

## 2018-01-26 PROCEDURE — 10907ZC DRAINAGE OF AMNIOTIC FLUID, THERAPEUTIC FROM PRODUCTS OF CONCEPTION, VIA NATURAL OR ARTIFICIAL OPENING: ICD-10-PCS | Performed by: OBSTETRICS & GYNECOLOGY

## 2018-01-26 PROCEDURE — 700105 HCHG RX REV CODE 258

## 2018-01-26 PROCEDURE — 3E033VJ INTRODUCTION OF OTHER HORMONE INTO PERIPHERAL VEIN, PERCUTANEOUS APPROACH: ICD-10-PCS | Performed by: OBSTETRICS & GYNECOLOGY

## 2018-01-26 PROCEDURE — 304964 HCHG RECOVERY ROOM TIME 1HR

## 2018-01-26 PROCEDURE — 700111 HCHG RX REV CODE 636 W/ 250 OVERRIDE (IP)

## 2018-01-26 PROCEDURE — 700102 HCHG RX REV CODE 250 W/ 637 OVERRIDE(OP)

## 2018-01-26 PROCEDURE — 306828 HCHG ANES-TIME GENERAL: Performed by: OBSTETRICS & GYNECOLOGY

## 2018-01-26 RX ORDER — ONDANSETRON 2 MG/ML
4 INJECTION INTRAMUSCULAR; INTRAVENOUS EVERY 6 HOURS PRN
Status: DISCONTINUED | OUTPATIENT
Start: 2018-01-26 | End: 2018-01-27

## 2018-01-26 RX ORDER — CITRIC ACID/SODIUM CITRATE 334-500MG
SOLUTION, ORAL ORAL
Status: COMPLETED
Start: 2018-01-26 | End: 2018-01-26

## 2018-01-26 RX ORDER — SODIUM CHLORIDE, SODIUM LACTATE, POTASSIUM CHLORIDE, CALCIUM CHLORIDE 600; 310; 30; 20 MG/100ML; MG/100ML; MG/100ML; MG/100ML
INJECTION, SOLUTION INTRAVENOUS CONTINUOUS
Status: DISCONTINUED | OUTPATIENT
Start: 2018-01-26 | End: 2018-01-26 | Stop reason: HOSPADM

## 2018-01-26 RX ORDER — METOCLOPRAMIDE HYDROCHLORIDE 5 MG/ML
10 INJECTION INTRAMUSCULAR; INTRAVENOUS ONCE
Status: DISCONTINUED | OUTPATIENT
Start: 2018-01-26 | End: 2018-01-26 | Stop reason: HOSPADM

## 2018-01-26 RX ORDER — DIPHENHYDRAMINE HYDROCHLORIDE 50 MG/ML
12.5 INJECTION INTRAMUSCULAR; INTRAVENOUS EVERY 6 HOURS PRN
Status: DISCONTINUED | OUTPATIENT
Start: 2018-01-26 | End: 2018-01-27

## 2018-01-26 RX ORDER — KETOROLAC TROMETHAMINE 30 MG/ML
30 INJECTION, SOLUTION INTRAMUSCULAR; INTRAVENOUS EVERY 6 HOURS
Status: COMPLETED | OUTPATIENT
Start: 2018-01-27 | End: 2018-01-27

## 2018-01-26 RX ORDER — KETOROLAC TROMETHAMINE 30 MG/ML
30 INJECTION, SOLUTION INTRAMUSCULAR; INTRAVENOUS EVERY 6 HOURS
Status: DISCONTINUED | OUTPATIENT
Start: 2018-01-26 | End: 2018-01-26

## 2018-01-26 RX ORDER — HYDROMORPHONE HYDROCHLORIDE 2 MG/ML
0.4 INJECTION, SOLUTION INTRAMUSCULAR; INTRAVENOUS; SUBCUTANEOUS
Status: DISCONTINUED | OUTPATIENT
Start: 2018-01-26 | End: 2018-01-27

## 2018-01-26 RX ORDER — SODIUM CHLORIDE, SODIUM LACTATE, POTASSIUM CHLORIDE, CALCIUM CHLORIDE 600; 310; 30; 20 MG/100ML; MG/100ML; MG/100ML; MG/100ML
1500 INJECTION, SOLUTION INTRAVENOUS ONCE
Status: DISCONTINUED | OUTPATIENT
Start: 2018-01-26 | End: 2018-01-26 | Stop reason: HOSPADM

## 2018-01-26 RX ORDER — METOCLOPRAMIDE HYDROCHLORIDE 5 MG/ML
INJECTION INTRAMUSCULAR; INTRAVENOUS
Status: COMPLETED
Start: 2018-01-26 | End: 2018-01-26

## 2018-01-26 RX ORDER — SODIUM CHLORIDE, SODIUM LACTATE, POTASSIUM CHLORIDE, CALCIUM CHLORIDE 600; 310; 30; 20 MG/100ML; MG/100ML; MG/100ML; MG/100ML
INJECTION, SOLUTION INTRAVENOUS
Status: COMPLETED
Start: 2018-01-26 | End: 2018-01-26

## 2018-01-26 RX ORDER — CITRIC ACID/SODIUM CITRATE 334-500MG
30 SOLUTION, ORAL ORAL ONCE
Status: DISCONTINUED | OUTPATIENT
Start: 2018-01-26 | End: 2018-01-26 | Stop reason: HOSPADM

## 2018-01-26 RX ORDER — OXYCODONE AND ACETAMINOPHEN 10; 325 MG/1; MG/1
1 TABLET ORAL EVERY 4 HOURS PRN
Status: DISCONTINUED | OUTPATIENT
Start: 2018-01-26 | End: 2018-01-27

## 2018-01-26 RX ORDER — CEFAZOLIN SODIUM 1 G/3ML
1 INJECTION, POWDER, FOR SOLUTION INTRAMUSCULAR; INTRAVENOUS ONCE
Status: DISCONTINUED | OUTPATIENT
Start: 2018-01-26 | End: 2018-01-26 | Stop reason: HOSPADM

## 2018-01-26 RX ORDER — OXYCODONE HYDROCHLORIDE AND ACETAMINOPHEN 5; 325 MG/1; MG/1
1 TABLET ORAL EVERY 4 HOURS PRN
Status: DISCONTINUED | OUTPATIENT
Start: 2018-01-26 | End: 2018-01-27

## 2018-01-26 RX ADMIN — SODIUM CHLORIDE, POTASSIUM CHLORIDE, SODIUM LACTATE AND CALCIUM CHLORIDE 1000 ML: 600; 310; 30; 20 INJECTION, SOLUTION INTRAVENOUS at 09:40

## 2018-01-26 RX ADMIN — SODIUM CHLORIDE, POTASSIUM CHLORIDE, SODIUM LACTATE AND CALCIUM CHLORIDE 1000 ML: 600; 310; 30; 20 INJECTION, SOLUTION INTRAVENOUS at 15:26

## 2018-01-26 RX ADMIN — METOCLOPRAMIDE 10 MG: 5 INJECTION, SOLUTION INTRAMUSCULAR; INTRAVENOUS at 16:50

## 2018-01-26 RX ADMIN — Medication 1 MILLI-UNITS/MIN: at 09:41

## 2018-01-26 RX ADMIN — FAMOTIDINE 10 MG: 10 INJECTION, SOLUTION INTRAVENOUS at 16:50

## 2018-01-26 RX ADMIN — SODIUM CITRATE AND CITRIC ACID MONOHYDRATE 5 ML: 500; 334 SOLUTION ORAL at 16:50

## 2018-01-26 ASSESSMENT — PAIN SCALES - GENERAL
PAINLEVEL_OUTOF10: 0

## 2018-01-26 ASSESSMENT — LIFESTYLE VARIABLES
EVER_SMOKED: NEVER
ALCOHOL_USE: NO

## 2018-01-26 NOTE — H&P
History and Physical;      Silvia Atkins is a 42 y.o. year old female  at 39w0d who presents for IOL- class A2 GDM    Subjective:   negative  For CTXS.   negative Feels pain   negative for LOF  negative for vaginal bleeding.   positive for fetal movement    ROS: A comprehensive review of systems was negative.    Past Medical History:   Diagnosis Date   • Gestational diabetes 2017   • Headache(784.0)      Past Surgical History:   Procedure Laterality Date   • TUBAL LIGATION Bilateral     tubal ligation reversal   • TUBAL COAGULATION LAPAROSCOPIC BILATERAL  1998    in 2016 she had reversal in [facility]     OB History    Para Term  AB Living   5 4 4     4   SAB TAB Ectopic Molar Multiple Live Births             4      # Outcome Date GA Lbr Shaun/2nd Weight Sex Delivery Anes PTL Lv   5 Current            4 Term 98 40w0d  3.856 kg (8 lb 8 oz) F Vag-Spont None N ARISTIDES   3 Term 94 39w0d  3 kg (6 lb 9.8 oz) F Vag-Spont None Y ARISTIDES   2 Term 93 40w0d  3.4 kg (7 lb 7.9 oz) M Vag-Spont None N ARISTIDES   1 Term 92 40w0d  3.5 kg (7 lb 11.5 oz) M Vag-Spont EPI N ARISTIDES        Social History     Social History   • Marital status: Single     Spouse name: N/A   • Number of children: N/A   • Years of education: N/A     Occupational History   • Not on file.     Social History Main Topics   • Smoking status: Never Smoker   • Smokeless tobacco: Never Used   • Alcohol use No   • Drug use: No   • Sexual activity: Yes     Partners: Male      Comment: none. Planned pregnancy     Other Topics Concern   • Not on file     Social History Narrative   • No narrative on file     Allergies: Patient has no known allergies.    Current Facility-Administered Medications:   •  dinoprostone gel (PROSTAGLANDIN) 5 mg, 5 mg, Vaginal, Once, Randy Ralph M.D.  •  LR infusion, , Intravenous, Continuous, Randy Ralph M.D.  •  mag hydrox-al hydrox-simeth (MAALOX PLUS ES or MYLANTA DS) suspension 30 mL, 30  "mL, Oral, Q6HRS PRN, Randy Ralph M.D.  •  misoprostol (CYTOTEC) tablet 800 mcg, 800 mcg, Rectal, Once PRN, Randy Ralph M.D.  •  methylergonovine (METHERGINE) injection 0.2 mg, 0.2 mg, Intramuscular, Once PRN, Randy Ralph M.D.    Prenatal care with [facility] with the following problems:  Patient Active Problem List    Diagnosis Date Noted   • Labor and delivery, indication for care 01/25/2018   • Gestational diabetes - A2 11/17/2017   • Advanced maternal age in multigravida 08/04/2017   • History of bilateral tubal ligation 08/04/2017   • History of reversal of tubal ligation 08/04/2017   • History of precipitous delivery 08/04/2017         Objective:      Blood pressure 108/67, pulse 71, height 1.676 m (5' 6\"), weight 94.3 kg (208 lb), last menstrual period 04/27/2017.    General:   no acute distress   Skin:  Warm dry   HEENT:  PERRLA   Lungs:   CTA bilateral   Heart:   brisk carotid upstroke without bruits, peripheral pulses very brisk, chest is clear without rales or wheezing, no pedal edema, no JVD, no hepatosplenomegaly   Abdomen:   gravid, NT   EFW:  3500 g   Pelvis:  Vulva and vagina appear normal. Bimanual exam reveals normal uterus and adnexa., proven to 3400 g   FHTs: 140 BPM good accels no decels good variability   Contractions: 0 contractions in 10 min soft to palpation   Uterine Size: S=D   Presentations: Cephalic per RN   Cervix: Per RN    Dilation: 1cm    Effacement: 50%    Station:  -2    Consistency: Medium    Position: Middle     Complete OB US  See labs    Lab Review  Lab:   Blood type: O     Recent Results (from the past 5880 hour(s))   POCT Urinalysis    Collection Time: 06/12/17  1:20 PM   Result Value Ref Range    POC Color STRAW Negative    POC Appearance CLEAR Negative    POC Leukocyte Esterase NEG Negative    POC Nitrites NEG Negative    POC Urobiligen NEG Negative (0.2) mg/dL    POC Protein TR Negative mg/dL    POC Urine PH 6.5 5.0 - 8.0    POC Blood LG Negative    POC Specific " Gravity 1.010 <1.005 - >1.030    POC Ketones NEG Negative mg/dL    POC Biliruben NEG Negative mg/dL    POC Glucose NEG Negative mg/dL   POC Urine Pregnancy    Collection Time: 06/12/17  1:20 PM   Result Value Ref Range    POC Urine Pregnancy Test Positive Negative    Internal Control Positive Positive     Internal Control Negative Negative    PAP IG, RFX HPV ASCU    Collection Time: 07/12/17 12:00 AM   Result Value Ref Range    Physician Read Pap Negative     PREG CNTR PRENATAL PN    Collection Time: 08/04/17  1:45 PM   Result Value Ref Range    WBC 9.6 4.8 - 10.8 K/uL    RBC 4.54 4.20 - 5.40 M/uL    Hemoglobin 13.1 12.0 - 16.0 g/dL    Hematocrit 39.5 37.0 - 47.0 %    MCV 87.0 81.4 - 97.8 fL    MCH 28.9 27.0 - 33.0 pg    MCHC 33.2 (L) 33.6 - 35.0 g/dL    RDW 40.5 35.9 - 50.0 fL    Platelet Count 257 164 - 446 K/uL    MPV 9.7 9.0 - 12.9 fL    Neutrophils-Polys 71.40 44.00 - 72.00 %    Lymphocytes 21.30 (L) 22.00 - 41.00 %    Monocytes 5.50 0.00 - 13.40 %    Eosinophils 1.00 0.00 - 6.90 %    Basophils 0.50 0.00 - 1.80 %    Immature Granulocytes 0.30 0.00 - 0.90 %    Nucleated RBC 0.00 /100 WBC    Neutrophils (Absolute) 6.85 2.00 - 7.15 K/uL    Lymphs (Absolute) 2.04 1.00 - 4.80 K/uL    Monos (Absolute) 0.53 0.00 - 0.85 K/uL    Eos (Absolute) 0.10 0.00 - 0.51 K/uL    Baso (Absolute) 0.05 0.00 - 0.12 K/uL    Immature Granulocytes (abs) 0.03 0.00 - 0.11 K/uL    NRBC (Absolute) 0.00 K/uL    Rubella IgG Antibody 289.60 IU/mL    Syphilis, Treponemal Qual Non Reactive Non Reactive    Hepatitis B Surface Antigen Negative Negative    Micro Urine Req see below     Color Straw     Character Clear     Specific Gravity 1.010 <1.035    Ph 7.0 5.0 - 8.0    Glucose Negative Negative mg/dL    Ketones Negative Negative mg/dL    Protein Negative Negative mg/dL    Bilirubin Negative Negative    Urobilinogen, Urine 0.2 Negative    Nitrite Negative Negative    Leukocyte Esterase Negative Negative    Occult Blood Negative Negative     Culture Indicated No UA Culture   HIV ANTIBODIES    Collection Time: 08/04/17  1:45 PM   Result Value Ref Range    HIV Ag/Ab Combo Assay Non Reactive Non Reactive   OP PRENATAL PANEL-BLOOD BANK    Collection Time: 08/04/17  1:45 PM   Result Value Ref Range    ABO Grouping Only O     Rh Grouping Only POS     Antibody Screen Scrn NEG    POCT Urinalysis    Collection Time: 08/04/17  2:05 PM   Result Value Ref Range    POC Color  Negative    POC Appearance  Negative    POC Leukocyte Esterase negative Negative    POC Nitrites negative Negative    POC Urobiligen  Negative (0.2) mg/dL    POC Protein trace Negative mg/dL    POC Urine PH 7.0 5.0 - 8.0    POC Blood trace Negative    POC Specific Gravity 1.005 <1.005 - >1.030    POC Ketones negative Negative mg/dL    POC Biliruben  Negative mg/dL    POC Glucose negative Negative mg/dL   AFP TETRA    Collection Time: 09/01/17  4:25 PM   Result Value Ref Range    AFP Value -Eia 26 ng/mL    AFP MOM Value 0.68     Hcg Value 6,004 IU/L    Hcg Mom 0.31     Ue3 Value 1.56 ng/mL    Ue3 Mom 1.10     Interpretation Normal     [age] MERLY [age]    Gestational Age Based On LNMP     Gestational Age 18.14 weeks    Insulin Dependent Diabetes No     Race      Multiple Pregnancy One     Tania Value -Eia 110 pg/mL    Tania Mom Value 0.75     Maternal Weight 183 lbs    Err Maternal Scrn AFP See Note    GLUCOSE 1HR GESTATIONAL    Collection Time: 11/09/17 11:14 AM   Result Value Ref Range    Glucose, Post Dose 191 (H) 70 - 139 mg/dL   T.PALLIDUM AB EIA    Collection Time: 11/09/17 11:14 AM   Result Value Ref Range    Syphilis, Treponemal Qual Non Reactive Non Reactive   HGB    Collection Time: 11/09/17 11:14 AM   Result Value Ref Range    Hemoglobin 12.5 12.0 - 16.0 g/dL   HCT    Collection Time: 11/09/17 11:14 AM   Result Value Ref Range    Hematocrit 37.1 37.0 - 47.0 %   GTT  (GESTATIONAL GLUCOSE 3 HR)    Collection Time: 11/15/17  8:20 AM   Result Value Ref Range    Glucose 2  Hour 143 65 - 155 mg/dL    Glucose 3 Hour 54 (L) 65 - 140 mg/dL    Glucose 1 Hour 235 (H) 65 - 180 mg/dL    Baseline Glucose 82 65 - 95 mg/dL   POCT Urinalysis    Collection Time: 12/07/17  2:04 PM   Result Value Ref Range    POC Color  Negative    POC Appearance  Negative    POC Leukocyte Esterase negative Negative    POC Nitrites negative Negative    POC Urobiligen  Negative (0.2) mg/dL    POC Protein negative Negative mg/dL    POC Urine PH 6.5 5.0 - 8.0    POC Blood negative Negative    POC Specific Gravity 1.010 <1.005 - >1.030    POC Ketones negative Negative mg/dL    POC Biliruben  Negative mg/dL    POC Glucose negative Negative mg/dL   POCT Urinalysis    Collection Time: 12/14/17  3:08 PM   Result Value Ref Range    POC Color  Negative    POC Appearance  Negative    POC Leukocyte Esterase negative Negative    POC Nitrites negative Negative    POC Urobiligen  Negative (0.2) mg/dL    POC Protein negative Negative mg/dL    POC Urine PH 5.0 5.0 - 8.0    POC Blood negative Negative    POC Specific Gravity 1.000 <1.005 - >1.030    POC Ketones negative Negative mg/dL    POC Biliruben  Negative mg/dL    POC Glucose negative Negative mg/dL   HEMOGLOBIN A1C    Collection Time: 12/14/17  4:00 PM   Result Value Ref Range    Glycohemoglobin 5.5 0.0 - 5.6 %    Est Avg Glucose 111 mg/dL   POCT Urinalysis    Collection Time: 12/21/17  9:04 AM   Result Value Ref Range    POC Color  Negative    POC Appearance  Negative    POC Leukocyte Esterase Negative Negative    POC Nitrites Negative Negative    POC Urobiligen  Negative (0.2) mg/dL    POC Protein NEgative Negative mg/dL    POC Urine PH 6.5 5.0 - 8.0    POC Blood Negative Negative    POC Specific Gravity 1 <1.005 - >1.030    POC Ketones 1.005 Negative mg/dL    POC Biliruben  Negative mg/dL    POC Glucose NEgative Negative mg/dL   POCT Fetal Nonstress Test    Collection Time: 12/21/17  9:27 AM   Result Value Ref Range    NST Indications GDM     NST Baseline 130     NST  Uterine Activity irreg UCs     NST Acoustic Stimulation yes     NST Assessment reactive, cat 1     NST Action Necessary      NST Other Data cont 2x wkly NST     NST Return as sched     NST Read By ELÍAS Rojo CNM    POCT Fetal Nonstress Test    Collection Time: 12/26/17  8:37 AM   Result Value Ref Range    NST Indications A2GDM     NST Baseline 130     NST Uterine Activity none     NST Acoustic Stimulation      NST Assessment reactive     NST Action Necessary      NST Other Data      NST Return 2 x a week     NST Read By RICK    POCT Fetal Nonstress Test    Collection Time: 12/28/17  9:38 AM   Result Value Ref Range    NST Indications diabetes     NST Baseline 140     NST Uterine Activity none     NST Acoustic Stimulation yes     NST Assessment nonreactive     NST Action Necessary needs biophysical profile     NST Other Data      NST Return      NST Read By     POCT Urinalysis    Collection Time: 12/28/17 10:10 AM   Result Value Ref Range    POC Color  Negative    POC Appearance  Negative    POC Leukocyte Esterase NEG Negative    POC Nitrites NEG Negative    POC Urobiligen  Negative (0.2) mg/dL    POC Protein NEG Negative mg/dL    POC Urine PH 6.5 5.0 - 8.0    POC Blood NEG Negative    POC Specific Gravity 1.000 <1.005 - >1.030    POC Ketones NEG Negative mg/dL    POC Biliruben  Negative mg/dL    POC Glucose NEG Negative mg/dL   POCT Fetal Nonstress Test    Collection Time: 01/02/18  8:48 AM   Result Value Ref Range    NST Indications GDM     NST Baseline 130     NST Uterine Activity no     NST Acoustic Stimulation no     NST Assessment reactive, cat 1     NST Action Necessary      NST Other Data      NST Return      NST Read By     POCT Fetal Nonstress Test    Collection Time: 01/04/18  9:16 AM   Result Value Ref Range    NST Indications A2GDM     NST Baseline 130s     NST Uterine Activity none     NST Acoustic Stimulation      NST Assessment Cat 1     NST Action Necessary      NST Other Data      NST Return twice  weekly     NST Read By [place]    POCT Urinalysis    Collection Time: 01/04/18  9:18 AM   Result Value Ref Range    POC Color  Negative    POC Appearance  Negative    POC Leukocyte Esterase negative Negative    POC Nitrites negative Negative    POC Urobiligen  Negative (0.2) mg/dL    POC Protein trace Negative mg/dL    POC Urine PH 6.0 5.0 - 8.0    POC Blood negative Negative    POC Specific Gravity 1.010 <1.005 - >1.030    POC Ketones negative Negative mg/dL    POC Biliruben  Negative mg/dL    POC Glucose negative Negative mg/dL   GRP B STREP, BY PCR ([place])    Collection Time: 01/04/18 10:28 AM   Result Value Ref Range    Strep Gp B DNA PCR Negative Negative   POCT Fetal Nonstress Test    Collection Time: 01/08/18 10:02 AM   Result Value Ref Range    NST Indications A2GDM     NST Baseline 130s     NST Uterine Activity none     NST Acoustic Stimulation      NST Assessment Cat 1     NST Action Necessary      NST Other Data      NST Return twice weekly     NST Read By [place]    POCT Fetal Nonstress Test    Collection Time: 01/11/18  9:07 AM   Result Value Ref Range    NST Indications A2 GDM     NST Baseline 135     NST Uterine Activity none     NST Acoustic Stimulation n/a     NST Assessment reactive     NST Action Necessary f/u 3-4d     NST Other Data      NST Return      NST Read By     POCT Urinalysis    Collection Time: 01/11/18  9:14 AM   Result Value Ref Range    POC Color  Negative    POC Appearance  Negative    POC Leukocyte Esterase Neg Negative    POC Nitrites Neg Negative    POC Urobiligen  Negative (0.2) mg/dL    POC Protein Neg Negative mg/dL    POC Urine PH 7.0 5.0 - 8.0    POC Blood Neg Negative    POC Specific Gravity 1.010 <1.005 - >1.030    POC Ketones Neg Negative mg/dL    POC Biliruben  Negative mg/dL    POC Glucose Neg Negative mg/dL   POCT Fetal Nonstress Test    Collection Time: 01/15/18 10:09 AM   Result Value Ref Range    NST Indications gdm     NST Baseline 140     NST Uterine  Activity no     NST Acoustic Stimulation yes     NST Assessment reactive     NST Action Necessary      NST Other Data      NST Return      NST Read By     POCT Urinalysis    Collection Time: 01/18/18 10:00 AM   Result Value Ref Range    POC Color  Negative    POC Appearance  Negative    POC Leukocyte Esterase Neg Negative    POC Nitrites Neg Negative    POC Urobiligen  Negative (0.2) mg/dL    POC Protein Neg Negative mg/dL    POC Urine PH 6.5 5.0 - 8.0    POC Blood Neg Negative    POC Specific Gravity 1.005 <1.005 - >1.030    POC Ketones Neg Negative mg/dL    POC Biliruben  Negative mg/dL    POC Glucose Neg Negative mg/dL   POCT Fetal Nonstress Test    Collection Time: 01/18/18 10:22 AM   Result Value Ref Range    NST Indications GDM     NST Baseline 120     NST Uterine Activity 1 CTX     NST Acoustic Stimulation yes     NST Assessment reactive, cat 1     NST Action Necessary      NST Other Data      NST Return      NST Read By     POCT Fetal Nonstress Test    Collection Time: 01/22/18 10:36 AM   Result Value Ref Range    NST Indications A2GDM     NST Baseline 140s     NST Uterine Activity none     NST Acoustic Stimulation      NST Assessment Cat 1 NST     NST Action Necessary      NST Other Data      NST Return twice weekly     NST Read By Alex         Assessment:   1.  IUP at 39w0d  2.  Labor status: Not in labor.  3.  Cat 1 FHTs  4.  Obstetrical history significant for   Patient Active Problem List    Diagnosis Date Noted   • Labor and delivery, indication for care 01/25/2018   • Gestational diabetes - A2 11/17/2017   • Advanced maternal age in multigravida 08/04/2017   • History of bilateral tubal ligation 08/04/2017   • History of reversal of tubal ligation 08/04/2017   • History of precipitous delivery 08/04/2017   .      Plan:     Admit to L&D  GBS negative  Routine labs  Pain management prn  PGE 2 Gel IOL

## 2018-01-26 NOTE — CARE PLAN
Problem: Risk for Fluid Imbalance  Goal: Promotion of Fluid Balance    Intervention: Promote oral intake as appropriate  IV saline lock, pt encouraged to hydrate orally      Problem: Risk for Infection, Impaired Wound Healing  Goal: Remain free from signs and symptoms of infection    Intervention: Infection prevention measures  Hand hygiene before and after care

## 2018-01-26 NOTE — PROGRESS NOTES
"L&D Progress Note      Subjective:  Pt feeling more pressure.     Objective  VS:/65   Pulse 68   Temp 36.7 °C (98.1 °F) (Temporal)   Resp 16   Ht 1.676 m (5' 6\")   Wt 94.3 kg (208 lb)   LMP 2017   BMI 33.57 kg/m²   FHTs:  140 + accels, no decels moderate variability  [place]: contrxns q2-3min  Pitocin: yes  SVE: /-1/Face presentation     A/P  Patient was evaluated earlier this with US and confirmed to be head down. AROM was planned, however on cervical exam infant was ballotable. At ~1525 patient reexamined, patient AROMed by Dr. Rubio. IUPC was attempted however when resistance was encountered placement was aborted.    1.  Term IUP -- labor  2.  Cat 1 FHTs  3.  Continue to monitor  4.  Possible , discussed with patient risk that if baby does not reposition head may required .   5.  Will reevaluate in 2 hours.     Resident Physician: Hitesh Means M.D.   This plan was discussed with attending physician Dr. Rubio   "

## 2018-01-26 NOTE — CARE PLAN
Problem: Infection  Goal: Will remain free from infection  Outcome: PROGRESSING AS EXPECTED  Pt VS  WDL no s/s of infection    Problem: Safety  Goal: Free from accidental injury  Outcome: PROGRESSING AS EXPECTED  Pt educated on fall prevention and encouraged to call for help if needed.

## 2018-01-26 NOTE — PROGRESS NOTES
"S: Pt is feeling contractions. Too many CTXs noted on strip to place gel or Cytotec. Discussed balloon placement w pt. Patient agreeable to procedure.       O:    Vitals:    01/25/18 1701 01/25/18 1926   BP: 108/67 118/70   Pulse: 71 67   Temp: 36.1 °C (97 °F) 36.3 °C (97.4 °F)   TempSrc: Temporal    Weight: 94.3 kg (208 lb)    Height: 1.676 m (5' 6\")            FHTs:  Baseline 125, + accels, - decels, moderate variability        [place]: Contractions q 2 minutes, moderate to palpation        SVE: 1/50/-2     A/P:  1.  IUP @ 39w1d            2.  Cat 1 FHTs             3.  Cook catheter placed           4.  Will reassess as indicated    Quynh Edge CNM, APRN  "

## 2018-01-26 NOTE — PROGRESS NOTES
42 y.o.    EDC=2/  39 weeks    TOCO and EFM on.  VSS.  Pt here for IOL for GDM.  Pt denies UCs, LOF or VB and states pos FM.  SVE=1.5/40/-2.  Report given to Dr. Ralph.  Orders to administer prostaglandin gel.  Pt updated on POC-states understanding.  8515-BS report given to Chris RN and Chelle RN, pt care assumed

## 2018-01-26 NOTE — CARE PLAN
Problem: Safety  Goal: Will remain free from injury  Outcome: PROGRESSING AS EXPECTED  Patient/Family instructed to call RN with any spills, cords in the way on the floor or any other safety hazards. Patient/Family also instructed to call RN with change to LOC, feelings of dizziness, blurry vision or any change to comfort or concern for safety.   Medication administered as ordered, verified with patient/scanned in to MAR and armband on patient.     Problem: Infection  Goal: Will remain free from infection  Outcome: PROGRESSING AS EXPECTED  Ongoing observation and assessment of signs/symptoms of potential infection. Patient/family aware of importance of handwashing and available foam on the wall for use as well.    Problem: Knowledge Deficit  Goal: Knowledge of disease process/condition, treatment plan, diagnostic tests, and medications will improve  Outcome: PROGRESSING AS EXPECTED  Discussed POC. Review of labor process/expectations, breathing/relaxation techniques. Discussed pain management options.   Patient/Family deny questions/concerns at this point, state understanding of POC/expectiations.  Patient encouraged to call RN with all questions/concerns/needs.

## 2018-01-26 NOTE — PROGRESS NOTES
"0700 - Report received from Chelle MCKENNA RN and Emeli VANEGAS RN at , care assumed.      Gannon Gestation today at 39.0 Weeks      Patient is in bed awake reporting mild cramps and occasional abdominal tightening. Reports feeling intense contractions when the balloon was initially placed. Reports spotting and mucous noted with last void. FOB \"Adi\" at  - patient is not expecting more visitors today. Patient would like the FOB at  during the pushing and delivery phase. Patient would like the NB placed directly to abdomen for immediate skin/skin. FOB would like to cut the cord.    Reports little sleep last night, denies ill feeling, reports FM, denies ROM or Bleeding. No change to vision/edema/HA; states she has been getting up to the BR herself without assist, denies dizziness or weakness.     Use of FM/[place] discussed and in place, discussed POC, cheerful affect/mood. Review of labor process/expectations, breathing/relaxation techniques - TBD as needed. Discussed pain management options - patient is open to IV meds and not an epidural.     Breakfast has been ordered, patient is eager to eat.     Patient/FOB deny questions/concerns regarding care since arrival to [facility].   RN contact information updated on the white board with discussion regarding how to request assistance.  Patient encouraged to call RN with all questions/concerns/needs.    0735 - Patient calls RN to report Balloon has fallen out. Contractions continue to occur, tolerable. Update to Quynh MARKS CNM.     1600 - S/P AROM and presentation of face noted, POC discussed between patient/physician is to allow the baby 2 hours to change position and descend otherwise  section would be discussed. Patient agrees to plan. Physician and RN encouraged and discussed importance of extreme changes in position to encourage the baby to shift the head and descend. Position changes from extreme left/right to hands/knees. RN has remained at  offering " support, assisting position changes and adjusting the FM. The patient is working through contractions with increasing effort. Patient desired IV medication. RN is hand holding the FM in place and the fht is clearly audible with accels noted and no decels noted. RN cannot assess variability audibly and cannot administer IV medication without a full assessment of the fetal tracing and wellbeing. Physician requested for assessment. Physician offered patient an epidural. Patient denies epidural stating she will continue to work through contractions. Dr. Quintanilla reassessed cervical status and presentation and found no change to either. Physician discussed with patient and FOB with a , the current status of labor, concerns for labor and delivery and recommendation for  section, patient/FOB state understanding and agree to  section. Patient immediately prepped and taken to the OR for procedure.    - Delivery of female by Dr. Rey by  section.    - Report to Mily LEWIS RN

## 2018-01-26 NOTE — PROGRESS NOTES
1900_ Assumed pt care. POC reviewed and understanding verbalized.   0015_ BENOIT. RUBA Edge @ bedside to evaluate pt. Cervical ripening balloon placed. 70ml of sterile water in uterine side and 50 ml on vagina side.  0655_ Report given to MIS Quintero.

## 2018-01-27 LAB
ERYTHROCYTE [DISTWIDTH] IN BLOOD BY AUTOMATED COUNT: 41.4 FL (ref 35.9–50)
HCT VFR BLD AUTO: 30 % (ref 37–47)
HGB BLD-MCNC: 10.2 G/DL (ref 12–16)
MCH RBC QN AUTO: 29.6 PG (ref 27–33)
MCHC RBC AUTO-ENTMCNC: 34 G/DL (ref 33.6–35)
MCV RBC AUTO: 87 FL (ref 81.4–97.8)
PLATELET # BLD AUTO: 143 K/UL (ref 164–446)
PMV BLD AUTO: 10.3 FL (ref 9–12.9)
RBC # BLD AUTO: 3.45 M/UL (ref 4.2–5.4)
WBC # BLD AUTO: 13.3 K/UL (ref 4.8–10.8)

## 2018-01-27 PROCEDURE — 700111 HCHG RX REV CODE 636 W/ 250 OVERRIDE (IP): Performed by: ANESTHESIOLOGY

## 2018-01-27 PROCEDURE — A9270 NON-COVERED ITEM OR SERVICE: HCPCS | Performed by: NURSE PRACTITIONER

## 2018-01-27 PROCEDURE — 85027 COMPLETE CBC AUTOMATED: CPT

## 2018-01-27 PROCEDURE — 700102 HCHG RX REV CODE 250 W/ 637 OVERRIDE(OP): Performed by: NURSE PRACTITIONER

## 2018-01-27 PROCEDURE — 700112 HCHG RX REV CODE 229: Performed by: NURSE PRACTITIONER

## 2018-01-27 PROCEDURE — 700102 HCHG RX REV CODE 250 W/ 637 OVERRIDE(OP): Performed by: OBSTETRICS & GYNECOLOGY

## 2018-01-27 PROCEDURE — A9270 NON-COVERED ITEM OR SERVICE: HCPCS | Performed by: OBSTETRICS & GYNECOLOGY

## 2018-01-27 PROCEDURE — 36415 COLL VENOUS BLD VENIPUNCTURE: CPT

## 2018-01-27 PROCEDURE — 770002 HCHG ROOM/CARE - OB PRIVATE (112)

## 2018-01-27 RX ORDER — VITAMIN A ACETATE, BETA CAROTENE, ASCORBIC ACID, CHOLECALCIFEROL, .ALPHA.-TOCOPHEROL ACETATE, DL-, THIAMINE MONONITRATE, RIBOFLAVIN, NIACINAMIDE, PYRIDOXINE HYDROCHLORIDE, FOLIC ACID, CYANOCOBALAMIN, CALCIUM CARBONATE, FERROUS FUMARATE, ZINC OXIDE, CUPRIC OXIDE 3080; 12; 120; 400; 1; 1.84; 3; 20; 22; 920; 25; 200; 27; 10; 2 [IU]/1; UG/1; MG/1; [IU]/1; MG/1; MG/1; MG/1; MG/1; MG/1; [IU]/1; MG/1; MG/1; MG/1; MG/1; MG/1
1 TABLET, FILM COATED ORAL EVERY MORNING
Status: DISCONTINUED | OUTPATIENT
Start: 2018-01-27 | End: 2018-01-29 | Stop reason: HOSPADM

## 2018-01-27 RX ORDER — METHYLERGONOVINE MALEATE 0.2 MG/ML
0.2 INJECTION INTRAVENOUS
Status: DISCONTINUED | OUTPATIENT
Start: 2018-01-27 | End: 2018-01-29 | Stop reason: HOSPADM

## 2018-01-27 RX ORDER — BISACODYL 10 MG
10 SUPPOSITORY, RECTAL RECTAL PRN
Status: DISCONTINUED | OUTPATIENT
Start: 2018-01-27 | End: 2018-01-29 | Stop reason: HOSPADM

## 2018-01-27 RX ORDER — MORPHINE SULFATE 4 MG/ML
4 INJECTION, SOLUTION INTRAMUSCULAR; INTRAVENOUS EVERY 4 HOURS PRN
Status: DISCONTINUED | OUTPATIENT
Start: 2018-01-27 | End: 2018-01-29 | Stop reason: HOSPADM

## 2018-01-27 RX ORDER — ONDANSETRON 2 MG/ML
4 INJECTION INTRAMUSCULAR; INTRAVENOUS EVERY 6 HOURS PRN
Status: DISCONTINUED | OUTPATIENT
Start: 2018-01-27 | End: 2018-01-29 | Stop reason: HOSPADM

## 2018-01-27 RX ORDER — IBUPROFEN 800 MG/1
800 TABLET ORAL EVERY 8 HOURS PRN
Status: DISCONTINUED | OUTPATIENT
Start: 2018-01-27 | End: 2018-01-29 | Stop reason: HOSPADM

## 2018-01-27 RX ORDER — DOCUSATE SODIUM 100 MG/1
100 CAPSULE, LIQUID FILLED ORAL 2 TIMES DAILY PRN
Status: DISCONTINUED | OUTPATIENT
Start: 2018-01-27 | End: 2018-01-29 | Stop reason: HOSPADM

## 2018-01-27 RX ORDER — OXYCODONE HYDROCHLORIDE AND ACETAMINOPHEN 5; 325 MG/1; MG/1
1 TABLET ORAL EVERY 4 HOURS PRN
Status: DISCONTINUED | OUTPATIENT
Start: 2018-01-27 | End: 2018-01-29 | Stop reason: HOSPADM

## 2018-01-27 RX ORDER — SIMETHICONE 80 MG
80 TABLET,CHEWABLE ORAL 4 TIMES DAILY PRN
Status: DISCONTINUED | OUTPATIENT
Start: 2018-01-27 | End: 2018-01-29 | Stop reason: HOSPADM

## 2018-01-27 RX ORDER — ACETAMINOPHEN 325 MG/1
325 TABLET ORAL EVERY 4 HOURS PRN
Status: DISCONTINUED | OUTPATIENT
Start: 2018-01-27 | End: 2018-01-29 | Stop reason: HOSPADM

## 2018-01-27 RX ORDER — MISOPROSTOL 200 UG/1
800 TABLET ORAL
Status: DISCONTINUED | OUTPATIENT
Start: 2018-01-27 | End: 2018-01-29 | Stop reason: HOSPADM

## 2018-01-27 RX ORDER — SODIUM CHLORIDE, SODIUM LACTATE, POTASSIUM CHLORIDE, CALCIUM CHLORIDE 600; 310; 30; 20 MG/100ML; MG/100ML; MG/100ML; MG/100ML
INJECTION, SOLUTION INTRAVENOUS PRN
Status: DISCONTINUED | OUTPATIENT
Start: 2018-01-27 | End: 2018-01-29 | Stop reason: HOSPADM

## 2018-01-27 RX ORDER — ONDANSETRON 4 MG/1
4 TABLET, ORALLY DISINTEGRATING ORAL EVERY 6 HOURS PRN
Status: DISCONTINUED | OUTPATIENT
Start: 2018-01-27 | End: 2018-01-29 | Stop reason: HOSPADM

## 2018-01-27 RX ORDER — FERROUS SULFATE 325(65) MG
325 TABLET ORAL
Status: DISCONTINUED | OUTPATIENT
Start: 2018-01-27 | End: 2018-01-29 | Stop reason: HOSPADM

## 2018-01-27 RX ORDER — OXYCODONE AND ACETAMINOPHEN 10; 325 MG/1; MG/1
1 TABLET ORAL EVERY 4 HOURS PRN
Status: DISCONTINUED | OUTPATIENT
Start: 2018-01-27 | End: 2018-01-29 | Stop reason: HOSPADM

## 2018-01-27 RX ADMIN — KETOROLAC TROMETHAMINE 30 MG: 30 INJECTION, SOLUTION INTRAMUSCULAR at 13:03

## 2018-01-27 RX ADMIN — KETOROLAC TROMETHAMINE 30 MG: 30 INJECTION, SOLUTION INTRAMUSCULAR at 17:10

## 2018-01-27 RX ADMIN — Medication 1 TABLET: at 08:49

## 2018-01-27 RX ADMIN — KETOROLAC TROMETHAMINE 30 MG: 30 INJECTION, SOLUTION INTRAMUSCULAR at 00:27

## 2018-01-27 RX ADMIN — OXYCODONE HYDROCHLORIDE AND ACETAMINOPHEN 1 TABLET: 5; 325 TABLET ORAL at 19:59

## 2018-01-27 RX ADMIN — Medication 325 MG: at 13:03

## 2018-01-27 RX ADMIN — Medication 325 MG: at 08:49

## 2018-01-27 RX ADMIN — DOCUSATE SODIUM 100 MG: 100 CAPSULE ORAL at 08:49

## 2018-01-27 RX ADMIN — Medication 325 MG: at 17:10

## 2018-01-27 RX ADMIN — KETOROLAC TROMETHAMINE 30 MG: 30 INJECTION, SOLUTION INTRAMUSCULAR at 06:06

## 2018-01-27 ASSESSMENT — PAIN SCALES - GENERAL
PAINLEVEL_OUTOF10: 0
PAINLEVEL_OUTOF10: 3
PAINLEVEL_OUTOF10: 0
PAINLEVEL_OUTOF10: 0

## 2018-01-27 NOTE — PROGRESS NOTES
AM Assessment completed, A/Ox4, see Doc flow sheet for reference Radha mathews this AM, enc to drink lots of fluids and to expected to void within 4-6 hrs.  Advises to call Nurse for any assistance needed.  Call light within reach.

## 2018-01-27 NOTE — CONSULTS
Lactation consult done. Baby undressed and placed skin to skin with mom. Hunger cues noted. Shown to mother. Baby to breast in cross cradle hold. Deep latch obtained quickly and suckling with swallowing observed. Discussed feeding on hunger cue for as long as baby desires to continue suckling. Enc to avoid artificial nipples for 4 weeks to keep baby oriented to breast and to ensure a good milk production. Skin to skin care encouraged.

## 2018-01-27 NOTE — OP REPORT
Section Operative Note    Date of Operation: 18    Preoperative Diagnosis:  Term IOL for A2GDM                                               Face presentation, arrest in cervical dilatation    Postoperative Diagnosis: same    Principal Procedure: Primary low transverse  Section via pfannenstiel    Surgeon: Meeta Babcock MD    Assistant: Hitesh Means MD PGY-1    Anesthesiologist: Casper Mooney MD/Spinal    Anesthesia: regional    Principal Procedure As Follows:  After adequate general anesthesia was ascertained, the patient was prepped and draped in a normal supine position with a wedge under the right hip.  A pfannensteil incision was made.  The incision was taken down to the fascia, which was incised medial to lateral.  The rectus muscles were dissected off the rectus sheath.  There was separation of the rectus sheath superiorly and the peritoneum was entered atraumatically, extended superiorly and inferiorly.  The lower uterine segment was identified.  A low transverse incision was made in the uterus.  It was then extended digitally and the membranes were previously ruptured with clear fluid.  The infant was born in a brow/face position.  It was a viable female infant, Apgar 8 and 9, and weight 3375 grams.  Placenta was manually delivered.  The uterus was exteriorized, cleansed of all clots and membranes.  The lower segment was dilated for lochia egress.  Attention was made towards closing the uterus in a 2-layer fashion with 0 Chromic suture ligature in a running interlocking stitch with hemostatis assured.  The gutters were cleansed of all clots.  Tubal ligation was not performed.  The uterus was reduced with normal tubes and ovaries.  The peritoneum was closed with running 3-0 Vicryl.  The rectus muscles were inspected, found to be hemostatic, and were closed with #1 Chromic in a interrupted fashion.  The fascia was closed with 0 Vicryl going right to left, left to right,  crossing in the midline with a running interlocking stitch.  The subcutaneous tissues was copiously irrigated.  Small capillary bleeders individually coagulated.  The subcutaneous tissues were approximated with interrupted 3-0 Vicryl and the skin with Insorb.  Estimated blood loss 400 mL and sponge and needle count were correct x 3.    Meeta Babcock M.D.

## 2018-01-27 NOTE — PROGRESS NOTES
Patient admitted to postpartum from L&D via [facility]. VSS. IV patent and infusing second bag of LR with pitocin. Garcia draining clear yellow urine. Fundus firm at umbilicus with light lochia. Armbands verified with infant and FOB.Oriented to surroundings and use of [employer] tv system. Denies pain at this time.

## 2018-01-27 NOTE — CARE PLAN
Problem: Altered physiologic condition related to postoperative  delivery  Goal: Patient physiologically stable as evidenced by normal lochia, palpable uterine involution and vital signs within normal limits  Outcome: PROGRESSING AS EXPECTED  Fundus firm at umbilicus with light lochia.    Problem: Potential for postpartum infection related to surgical incision, compromised uterine condition, urinary tract or respiratory compromise  Goal: Patient will be afebrile and free from signs and symptoms of infection  Outcome: PROGRESSING AS EXPECTED  Vital signs within normal range.    Problem: Alteration in comfort related to surgical incision and/or after birth pains  Goal: Patient verbalizes acceptable pain level  Outcome: PROGRESSING AS EXPECTED  Verbalizes acceptable pain relief with pain medication being given as requested and scheduled.Denies pain at this time.

## 2018-01-27 NOTE — OR SURGEON
Immediate Post OP Note    PreOp Diagnosis: Term 39 weeks  IOL for A2GDM                                 Face presentation                                 Arrest in cervical dilatation                                 S/p tubal reversal 2016    PostOp Diagnosis: same    PROCEDURE: PRIMARY LOW TRANSVERSE  SECTION VIA PFANNENSTIEL     Anesthesiologist/Type of Anesthesia:  REBEKA MIRANDA MD/SPINAL    Surgical Staff:  * Surgery not found *    Specimens:    Estimated Blood Loss: 400 CC    Findings: delivered to live born female, face presentation                  BW - 3375 (7 lb 7 oz)    AS 8/9                  Clear AF                  Uterus with multiple small < 1 cm subserous fibroids                  Bilateral ovaries normal    Complications: none    2018 6:19 PM Meeta Babcock MD

## 2018-01-27 NOTE — PROGRESS NOTES
1900 received report from SHONA Cesar RN, assumed care, POC discussed. Pt resting comfortably in recovery room with baby in arms. Pt denies pain or needs, VSS. Fundus firm and light rubra noted.   1930 pt transferred to PPU with baby in arms, both in stable condition, report given to PPU RN, assumed care, POC discussed, VSS.

## 2018-01-27 NOTE — PROGRESS NOTES
Post Partum Progress Note    Name:   Silvia Atkins   Date/Time:  2018 - 7:08 AM  Chief Admitting Dx:  Pregnancy  Labor and delivery, indication for care  Arrest of cervical dilation/ face presentation.   Delivery Type:   for failure to progress; face presentation  Post-Op/Post Partum Days #:  1    Subjective:  Abdominal pain: no  Ambulating:   yes  Tolerating liquids:  yes  Tolerating food:  yes common adult  Flatus:   yes  BM:    no  Bleeding:   without any bleeding  Voiding:   yes  Dizziness:   no  Feeding:   breast    Vitals:    18 0300 18 0400 18 0500 18 0600   BP:  (!) 98/60     Pulse: 74 83 76 69   Resp: 16 16 16 16   Temp:  36.4 °C (97.5 °F)     TempSrc:       SpO2: 96% 96% 97% 97%   Weight:       Height:           Exam:  Breast: Tenderness yes  Abdomen: Abdomen soft, non-tender. BS normal. No masses,  No organomegaly  Fundal Tenderness:  no  Fundus Firm: yes  Incision: dry and intact dressing  Below umbilicus: yes  Perineum: perineum intact  Lochia: mild  Extremities: Normal extremities, peripheral pulses and reflexes normal    Meds:  Current Facility-Administered Medications   Medication Dose   • LR infusion     • PRN oxytocin (PITOCIN) (20 Units/1000 mL) PRN for excessive uterine bleeding - See Admin Instr  125-999 mL/hr   • misoprostol (CYTOTEC) tablet 800 mcg  800 mcg   • methylergonovine (METHERGINE) injection 0.2 mg  0.2 mg   • docusate sodium (COLACE) capsule 100 mg  100 mg   • bisacodyl (DULCOLAX) suppository 10 mg  10 mg   • magnesium hydroxide (MILK OF MAGNESIA) suspension 30 mL  30 mL   • simethicone (MYLICON) chewable tab 80 mg  80 mg   • prenatal plus vitamin (STUARTNATAL 1+1) 27-1 MG tablet 1 Tab  1 Tab   • ibuprofen (MOTRIN) tablet 800 mg  800 mg   • acetaminophen (TYLENOL) tablet 325 mg  325 mg   • morphine (pf) 4 mg/ml injection 4 mg  4 mg   • ondansetron (ZOFRAN) syringe/vial injection 4 mg  4 mg    Or   • ondansetron (ZOFRAN ODT) dispertab 4  mg  4 mg   • oxycodone-acetaminophen (PERCOCET) 5-325 MG per tablet 1 Tab  1 Tab   • oxycodone-acetaminophen (PERCOCET-10)  MG per tablet 1 Tab  1 Tab   • oxytocin (PITOCIN) infusion (for postpartum)  2,000 mL/hr    Followed by   • oxytocin (PITOCIN) infusion (for postpartum)   mL/hr   • oxytocin (PITOCIN) 20 UNITS/1000ML LR (induction of labor)  0.5-20 too-units/min   • oxycodone-acetaminophen (PERCOCET) 5-325 MG per tablet 1 Tab  1 Tab   • oxycodone-acetaminophen (PERCOCET-10)  MG per tablet 1 Tab  1 Tab   • HYDROmorphone (DILAUDID) injection 0.4 mg  0.4 mg   • ephedrine injection 10 mg  10 mg   • diphenhydrAMINE (BENADRYL) injection 12.5 mg  12.5 mg   • ondansetron (ZOFRAN) syringe/vial injection 4 mg  4 mg   • ketorolac (TORADOL) injection 30 mg  30 mg       Labs:   Recent Labs      18   1730  18   0601   WBC  10.0  13.3*   RBC  4.18*  3.45*   HEMOGLOBIN  11.8*  10.2*   HEMATOCRIT  36.3*  30.0*   MCV  86.8  87.0   MCH  28.2  29.6   MCHC  32.5*  34.0   RDW  42.7  41.4   PLATELETCT  181  143*   MPV  10.5  10.3       Assessment:  Chief Admitting Dx:  Pregnancy  Labor and delivery, indication for care  Arrest of cervical dilation/ face presentation.   Delivery Type:   for failure to progress; face presentation  Tubal Ligation:  no    Plan:  Continue routine post partum care.  Ambulate  Continue to breast feed  CPM  Ferrous sulfate TID    Meeta Babcock M.D.

## 2018-01-28 PROCEDURE — A9270 NON-COVERED ITEM OR SERVICE: HCPCS | Performed by: NURSE PRACTITIONER

## 2018-01-28 PROCEDURE — 700112 HCHG RX REV CODE 229: Performed by: NURSE PRACTITIONER

## 2018-01-28 PROCEDURE — 700102 HCHG RX REV CODE 250 W/ 637 OVERRIDE(OP): Performed by: OBSTETRICS & GYNECOLOGY

## 2018-01-28 PROCEDURE — 700102 HCHG RX REV CODE 250 W/ 637 OVERRIDE(OP): Performed by: NURSE PRACTITIONER

## 2018-01-28 PROCEDURE — A9270 NON-COVERED ITEM OR SERVICE: HCPCS | Performed by: OBSTETRICS & GYNECOLOGY

## 2018-01-28 PROCEDURE — 770002 HCHG ROOM/CARE - OB PRIVATE (112)

## 2018-01-28 RX ADMIN — OXYCODONE HYDROCHLORIDE AND ACETAMINOPHEN 1 TABLET: 10; 325 TABLET ORAL at 22:01

## 2018-01-28 RX ADMIN — OXYCODONE HYDROCHLORIDE AND ACETAMINOPHEN 1 TABLET: 10; 325 TABLET ORAL at 05:16

## 2018-01-28 RX ADMIN — DOCUSATE SODIUM 100 MG: 100 CAPSULE ORAL at 22:01

## 2018-01-28 RX ADMIN — Medication 325 MG: at 17:19

## 2018-01-28 RX ADMIN — IBUPROFEN 800 MG: 800 TABLET, FILM COATED ORAL at 17:19

## 2018-01-28 RX ADMIN — OXYCODONE HYDROCHLORIDE AND ACETAMINOPHEN 1 TABLET: 5; 325 TABLET ORAL at 17:19

## 2018-01-28 RX ADMIN — IBUPROFEN 800 MG: 800 TABLET, FILM COATED ORAL at 05:16

## 2018-01-28 RX ADMIN — Medication 325 MG: at 07:28

## 2018-01-28 RX ADMIN — OXYCODONE HYDROCHLORIDE AND ACETAMINOPHEN 1 TABLET: 5; 325 TABLET ORAL at 13:04

## 2018-01-28 RX ADMIN — Medication 325 MG: at 13:04

## 2018-01-28 RX ADMIN — Medication 1 TABLET: at 07:28

## 2018-01-28 ASSESSMENT — PAIN SCALES - GENERAL
PAINLEVEL_OUTOF10: 2
PAINLEVEL_OUTOF10: 2
PAINLEVEL_OUTOF10: 0
PAINLEVEL_OUTOF10: 2
PAINLEVEL_OUTOF10: 2
PAINLEVEL_OUTOF10: 8
PAINLEVEL_OUTOF10: 0
PAINLEVEL_OUTOF10: 7
PAINLEVEL_OUTOF10: 5
PAINLEVEL_OUTOF10: 5

## 2018-01-28 NOTE — PROGRESS NOTES
Assumed pt care. Assessment complete. VSS. Fundus firm, lochia light. Incision approximated with steri strips with minimal old drainage. Pt would like pain medication as needed. Call light within reach. Will continue to monitor.

## 2018-01-28 NOTE — PROGRESS NOTES
Name:   Silvia Atkins   Date/Time:  2018 7:10 AM  Gestational Age:  39w3d  Admit Date:   2018  Admitting Dx:   Pregnancy  Labor and delivery, indication for care  Arrest of cervical dilation/ face presentation.     POD# 2 S/P CS for face presentation     S:  Abdominal pain no   Ambulating   yes  Tolerating PO  yes  Flatus    no  Bleeding   no  Voiding   no   Dizziness   no  Breast feeding  Yes Milk not in   Breast tenderness  no    O:  Pulse: 75  Blood Pressure: (!) 98/73     Temp  Av.6 °C (97.9 °F)  Min: 36.4 °C (97.6 °F)  Max: 36.7 °C (98 °F)  Heart: regular rate and rhythm without gallops or murmurs  Lungs: clear bases  Abdomen: flat and soft/nontender / bowelsounds present / incision clean and dry.  Extremities: non-tender  Catheter: DC'd    Intake/Output Summary (Last 24 hours) at 18 0710  Last data filed at 18 1300   Gross per 24 hour   Intake                0 ml   Output              500 ml   Net             -500 ml       A:  POD# 2 S/P CS for failure to descend   Stable/progressing well    P:  Routine C/S Postpartum care, continue pain management, encourage ambulation, anticipate DC POD#3     Hitesh Means M.D.

## 2018-01-29 VITALS
OXYGEN SATURATION: 97 % | HEIGHT: 66 IN | HEART RATE: 75 BPM | SYSTOLIC BLOOD PRESSURE: 101 MMHG | DIASTOLIC BLOOD PRESSURE: 73 MMHG | WEIGHT: 208 LBS | BODY MASS INDEX: 33.43 KG/M2 | RESPIRATION RATE: 19 BRPM | TEMPERATURE: 97.7 F

## 2018-01-29 PROCEDURE — 700102 HCHG RX REV CODE 250 W/ 637 OVERRIDE(OP): Performed by: OBSTETRICS & GYNECOLOGY

## 2018-01-29 PROCEDURE — A9270 NON-COVERED ITEM OR SERVICE: HCPCS | Performed by: OBSTETRICS & GYNECOLOGY

## 2018-01-29 PROCEDURE — A9270 NON-COVERED ITEM OR SERVICE: HCPCS | Performed by: NURSE PRACTITIONER

## 2018-01-29 PROCEDURE — 700102 HCHG RX REV CODE 250 W/ 637 OVERRIDE(OP): Performed by: NURSE PRACTITIONER

## 2018-01-29 RX ORDER — HYDROCODONE BITARTRATE AND ACETAMINOPHEN 5; 325 MG/1; MG/1
1 TABLET ORAL EVERY 6 HOURS PRN
Qty: 15 TAB | Refills: 0 | Status: SHIPPED | OUTPATIENT
Start: 2018-01-29 | End: 2018-02-12

## 2018-01-29 RX ORDER — FERROUS SULFATE 325(65) MG
325 TABLET ORAL DAILY
Qty: 30 TAB | Refills: 5 | Status: SHIPPED | OUTPATIENT
Start: 2018-01-29

## 2018-01-29 RX ORDER — PSEUDOEPHEDRINE HCL 30 MG
100 TABLET ORAL 2 TIMES DAILY PRN
Qty: 60 CAP | Refills: 5 | Status: SHIPPED | OUTPATIENT
Start: 2018-01-29

## 2018-01-29 RX ORDER — IBUPROFEN 800 MG/1
800 TABLET ORAL EVERY 8 HOURS PRN
Qty: 30 TAB | Refills: 5 | Status: SHIPPED | OUTPATIENT
Start: 2018-01-29

## 2018-01-29 RX ADMIN — OXYCODONE HYDROCHLORIDE AND ACETAMINOPHEN 1 TABLET: 10; 325 TABLET ORAL at 05:45

## 2018-01-29 RX ADMIN — Medication 325 MG: at 08:08

## 2018-01-29 RX ADMIN — Medication 1 TABLET: at 08:08

## 2018-01-29 RX ADMIN — IBUPROFEN 800 MG: 800 TABLET, FILM COATED ORAL at 05:45

## 2018-01-29 ASSESSMENT — PAIN SCALES - GENERAL
PAINLEVEL_OUTOF10: 4
PAINLEVEL_OUTOF10: 2
PAINLEVEL_OUTOF10: 7
PAINLEVEL_OUTOF10: 2

## 2018-01-29 NOTE — DISCHARGE INSTRUCTIONS
POSTPARTUM DISCHARGE INSTRUCTIONS FOR MOM    YOB: 1975   Age: 42 y.o.               Admit Date: 2018     Discharge Date: 2018  Attending Doctor:  Randy Ralph M.D.                  Allergies:  Patient has no known allergies.    Discharged to home by car. Discharged via wheelchair, hospital escort: Yes.  Special equipment needed: Not Applicable  Belongings with: Personal  Be sure to schedule a follow-up appointment with your primary care doctor or any specialists as instructed.     Discharge Plan:   Influenza Vaccine Indication: Patient Refuses    REASONS TO CALL YOUR OBSTETRICIAN:  1.   Persistent fever or shaking chills (Temperature higher than 100.4)  2.   Heavy bleeding (soaking more than 1 pad per hour); Passing clots  3.   Foul odor from vagina  4.   Mastitis (Breast infection; breast pain, chills, fever, redness)  5.   Urinary pain, burning or frequency  6.   Episiotomy infection  7.   Abdominal incision infection  8.   Severe depression longer than 24 hours    HAND WASHING  · Prior to handling the baby.  · Before breastfeeding or bottle feeding baby.  · After using the bathroom or changing the baby's diaper.    WOUND CARE  Ask your physician for additional care instructions.  In general:    ·  Incision:      · Keep clean and dry.    · Do NOT lift anything heavier than your baby for up to 6 weeks.    · There should not be any opening or pus.      VAGINAL CARE  · Nothing inside vagina for 6 weeks: no sexual intercourse, tampons or douching.  · Bleeding may continue for 2-4 weeks.  Amount may vary.    · Call your physician for heavy bleeding which means soaking more than 1 pad per hour    BIRTH CONTROL  · It is possible to become pregnant at any time after delivery and while breastfeeding.  · Plan to discuss a method of birth control with your physician at your follow up visit. visit.    DIET AND ELIMINATION  · Eating more fiber (bran cereal, fruits, and vegetables) and drinking  "plenty of fluids will help to avoid constipation.  · Urinary frequency after childbirth is normal.    POSTPARTUM BLUES  During the first few days after birth, you may experience a sense of the \"blues\" which may include impatience, irritability or even crying.  These feeling come and go quickly.  However, as many as 1 in 10 women experience emotional symptoms known as postpartum depression.    Postpartum depression:  May start as early as the second or third day after delivery or take several weeks or months to develop.  Symptoms of \"blues\" are present, but are more intense:  Crying spells; loss of appetite; feelings of hopelessness or loss of control; fear of touching the baby; over concern or no concern at all about the baby; little or no concern about your own appearance/caring for yourself; and/or inability to sleep or excessive sleeping.  Contact your physician if you are experiencing any of these symptoms.    Crisis Hotline:  · [place] Crisis Hotline:  1-461-OPHILVJ  Or 1-815.358.1895  · [place] Crisis Hotline:  1-992.950.4811  Or 275-367-8666    PREVENTING SHAKEN BABY:  If you are angry or stressed, PUT THE BABY IN THE CRIB, step away, take some deep breaths, and wait until you are calm to care for the baby.  DO NOT SHAKE THE BABY.  You are not alone, call a supporter for help.    · Crisis Call Center 24/7 crisis line 868-380-0973 or 1-132.271.9403  · You can also text them, text \"ANSWER\" to 441514    QUIT SMOKING/TOBACCO USE:  I understand the use of any tobacco products increases my chance of suffering from future heart disease and could cause other illnesses which may shorten my life. Quitting the use of tobacco products is the single most important thing I can do to improve my health. For further information on smoking / tobacco cessation call a Toll Free Quit Line at 1-432.940.8740 (*National [employer] [place]) or 1-222.672.7022 (American Lung Association) or you can access the web based program at " www.lungusa.org.    · [place] Tobacco Users Help Line:  (186) 632-3340       Toll Free: 1-106.343.9598  · Quit Tobacco Program [facility] (249)535-9597    DEPRESSION / SUICIDE RISK:  As you are discharged from this [facility], it is important to learn how to keep safe from harming yourself.    Recognize the warning signs:  · Abrupt changes in personality, positive or negative- including increase in energy   · Giving away possessions  · Change in eating patterns- significant weight changes-  positive or negative  · Change in sleeping patterns- unable to sleep or sleeping all the time   · Unwillingness or inability to communicate  · Depression  · Unusual sadness, discouragement and loneliness  · Talk of wanting to die  · Neglect of personal appearance   · Rebelliousness- reckless behavior  · Withdrawal from people/activities they love  · Confusion- inability to concentrate     If you or a loved one observes any of these behaviors or has concerns about self-harm, here's what you can do:  · Talk about it- your feelings and reasons for harming yourself  · Remove any means that you might use to hurt yourself (examples: pills, rope, extension cords, firearm)  · Get professional help from the community (Mental Health, Substance Abuse, psychological counseling)  · Do not be alone:Call your Safe Contact- someone whom you trust who will be there for you.  · Call your local CRISIS HOTLINE 848-0066 or 071-918-1697  · Call your local Children's Mobile Crisis Response Team [place] (949) 781-6610 or www.[employer]  · Call the toll free National Suicide Prevention Hotlines   · National Suicide Prevention Lifeline 167-399-UGUS (8463)  · National Hope Line Network 800-SUICIDE (046-9955)    DISCHARGE SURVEY:  Thank you for choosing [facility].  We hope we provided you with very good care.  You may be receiving a survey in the mail.  Please fill it out.  Your opinion is valuable to  us.    ADDITIONAL EDUCATIONAL MATERIALS GIVEN TO PATIENT:        My signature on this form indicates that:  1.  I have reviewed and understand the above information  2.  My questions regarding this information have been answered to my satisfaction.  3.  I have formulated a plan with my discharge nurse to obtain my prescribed medication for home.

## 2018-01-29 NOTE — PROGRESS NOTES
MOB denies needing assistance with breastfeeding at this time.  She states she is doing both breast and bottle feeding.  No latch observed.    Denies any pain with breastfeeding.    MOB states infant is latching well and is able to sustain a latch greater than 10 minutes at a time.  Infant's weight loss to date is 3.1%.    MOB aware of how much formula to feed infant based on supplemental guidelines.  A copy of these guidelines was provided to her.    MOB states has a copy of the Breastfeeding Essentials pamphlet and is aware that she can call the phone number on the back with breastfeeding questions.    Encouraged to call for assistance if needed.

## 2018-01-29 NOTE — CARE PLAN
Problem: Altered physiologic condition related to postoperative  delivery  Goal: Patient physiologically stable as evidenced by normal lochia, palpable uterine involution and vital signs within normal limits  Outcome: PROGRESSING AS EXPECTED  Fundal massage done with light bleeding    Problem: Alteration in comfort related to surgical incision and/or after birth pains  Goal: Patient is able to ambulate, care for self and infant with acceptable pain level  Outcome: PROGRESSING AS EXPECTED  Pain controlled

## 2018-01-29 NOTE — DISCHARGE SUMMARY
Discharge Summary:      Silvia Atkins      Admit Date:   2018  Discharge Date:  2018     Admitting diagnosis:  Pregnancy  Labor and delivery, indication for care  Arrest of cervical dilation/ face presentation.   Discharge Diagnosis: Status post  for failure to progress.  Pregnancy Complications: gestational diabetes  Tubal Ligation:  no        History:  Past Medical History:   Diagnosis Date   • Gestational diabetes 2017   • Headache(784.0)      OB History    Para Term  AB Living   5 4 4     4   SAB TAB Ectopic Molar Multiple Live Births             4      # Outcome Date GA Lbr Shaun/2nd Weight Sex Delivery Anes PTL Lv   5 Current            4 Term 98 40w0d  3.856 kg (8 lb 8 oz) F Vag-Spont None N ARISTIDES   3 Term 94 39w0d  3 kg (6 lb 9.8 oz) F Vag-Spont None Y ARISTIDES   2 Term 93 40w0d  3.4 kg (7 lb 7.9 oz) M Vag-Spont None N ARISTIDES   1 Term 92 40w0d  3.5 kg (7 lb 11.5 oz) M Vag-Spont EPI N ARISTIDES           Patient has no known allergies.  Patient Active Problem List    Diagnosis Date Noted   • Labor and delivery, indication for care 2018   • Gestational diabetes - A2 2017   • Advanced maternal age in multigravida 2017   • History of bilateral tubal ligation 2017   • History of reversal of tubal ligation 2017   • History of precipitous delivery 2017        Hospital Course:   42 y.o. , now para 5, was admitted with the above mentioned diagnosis, underwent Active Labor,  for failure to progress. Patient postpartum course was unremarkable, with progressive advancement in diet , ambulation and toleration of oral analgesia. Patient without complaints today and desires discharge.      Vitals:    18 0000 18 0400 18 0800 18   BP: (!) 93/61 (!) 98/73 (!) 90/53 (!) 94/64   Pulse: 67 75 74 83   Resp:    Temp: 36.7 °C (98 °F) 36.7 °C (98 °F) 36.7 °C (98.1 °F) 36.6 °C (97.8 °F)    TempSrc:       SpO2: 94% 95% 94% 93%   Weight:       Height:           Current Facility-Administered Medications   Medication Dose   • LR infusion     • PRN oxytocin (PITOCIN) (20 Units/1000 mL) PRN for excessive uterine bleeding - See Admin Instr  125-999 mL/hr   • misoprostol (CYTOTEC) tablet 800 mcg  800 mcg   • methylergonovine (METHERGINE) injection 0.2 mg  0.2 mg   • docusate sodium (COLACE) capsule 100 mg  100 mg   • bisacodyl (DULCOLAX) suppository 10 mg  10 mg   • magnesium hydroxide (MILK OF MAGNESIA) suspension 30 mL  30 mL   • simethicone (MYLICON) chewable tab 80 mg  80 mg   • prenatal plus vitamin (STUARTNATAL 1+1) 27-1 MG tablet 1 Tab  1 Tab   • ibuprofen (MOTRIN) tablet 800 mg  800 mg   • acetaminophen (TYLENOL) tablet 325 mg  325 mg   • morphine (pf) 4 mg/ml injection 4 mg  4 mg   • ondansetron (ZOFRAN) syringe/vial injection 4 mg  4 mg    Or   • ondansetron (ZOFRAN ODT) dispertab 4 mg  4 mg   • oxycodone-acetaminophen (PERCOCET) 5-325 MG per tablet 1 Tab  1 Tab   • oxycodone-acetaminophen (PERCOCET-10)  MG per tablet 1 Tab  1 Tab   • ferrous sulfate tablet 325 mg  325 mg   • oxytocin (PITOCIN) infusion (for postpartum)   mL/hr   • oxytocin (PITOCIN) 20 UNITS/1000ML LR (induction of labor)  0.5-20 too-units/min       Exam:  Breast Exam: negative  Abdomen: Abdomen soft, non-tender. BS normal. No masses,  No organomegaly  Fundus Non Tender: yes  Incision: dry and intact  Perineum: perineum intact  Extremity: extremities, peripheral pulses and reflexes normal, no edema, redness or tenderness in the calves or thighs     Labs:  Recent Labs      01/27/18   0601   WBC  13.3*   RBC  3.45*   HEMOGLOBIN  10.2*   HEMATOCRIT  30.0*   MCV  87.0   MCH  29.6   MCHC  34.0   RDW  41.4   PLATELETCT  143*   MPV  10.3        Activity:   Discharge to home  Pelvic Rest x 6 weeks    Assessment:  normal postpartum course  Discharge Assessment: No areas of skin breakdown/redness; surgical incision  intact/healing     Follow up: [facility] in 5 weeks for vaginal ; 1 week for incision check.      Discharge Meds:   Current Outpatient Prescriptions   Medication Sig Dispense Refill   • hydrocodone-acetaminophen (NORCO) 5-325 MG Tab per tablet Take 1 Tab by mouth every 6 hours as needed (severe pain) for up to 14 days. 15 Tab 0   • ibuprofen (MOTRIN) 800 MG Tab Take 1 Tab by mouth every 8 hours as needed (For cramping after delivery; do not give if patient is receiving ketorolac (Toradol)). 30 Tab 5   • docusate sodium 100 MG Cap Take 100 mg by mouth 2 times a day as needed for Constipation. 60 Cap 5   • ferrous sulfate 325 (65 Fe) MG tablet Take 1 Tab by mouth every day. 30 Tab 5       YURI Ochoa.

## 2018-01-29 NOTE — PROGRESS NOTES
1950 Pt doing well bonding with baby, Assessment done wound open to air steri strip to skin with an old blood, Pt denies pain at this time, Encourage more ambulation, Needs attended.

## 2018-01-30 ENCOUNTER — TELEPHONE (OUTPATIENT)
Dept: OBGYN | Facility: CLINIC | Age: 43
End: 2018-01-30

## 2018-01-30 NOTE — TELEPHONE ENCOUNTER
Call from Sil at [facility] asking questions about patient and letters that were issued. Consent from patient received via fax. Scanned in media. Letter stating patient was seen 2 x week and had weight restrictions was issued and signed by Dr Jensen and faxed to [facility].

## 2018-02-02 ENCOUNTER — HOSPITAL ENCOUNTER (OUTPATIENT)
Dept: RADIOLOGY | Facility: MEDICAL CENTER | Age: 43
End: 2018-02-02
Attending: OBSTETRICS & GYNECOLOGY
Payer: MEDICAID

## 2018-02-02 ENCOUNTER — POST PARTUM (OUTPATIENT)
Dept: OBGYN | Facility: CLINIC | Age: 43
End: 2018-02-02
Payer: MEDICAID

## 2018-02-02 VITALS — DIASTOLIC BLOOD PRESSURE: 64 MMHG | SYSTOLIC BLOOD PRESSURE: 120 MMHG | WEIGHT: 197 LBS | BODY MASS INDEX: 31.8 KG/M2

## 2018-02-02 DIAGNOSIS — M79.89 LEFT LEG SWELLING: ICD-10-CM

## 2018-02-02 DIAGNOSIS — Z98.891 STATUS POST C-SECTION: ICD-10-CM

## 2018-02-02 PROCEDURE — 99024 POSTOP FOLLOW-UP VISIT: CPT | Performed by: OBSTETRICS & GYNECOLOGY

## 2018-02-02 PROCEDURE — 93971 EXTREMITY STUDY: CPT | Mod: LT

## 2018-02-02 NOTE — NON-PROVIDER
Pt here today for C/Section check  Operation date: 01/26/2018  WT: 197 lb  BP: 120/64  Pt states incision is healing well, states pain has decreased. Reports left leg swelling.   Good # [phone]

## 2018-02-02 NOTE — PROGRESS NOTES
Silvia Atkins Is a 42 y.o.  status post  delivery on 18. Patient is here today for incision check. Currently the patient is with complaints. Left leg swelling >right. No significant pain She reports Normal  BM.  Normal  appetite without nausea and vomiting. She denies fever. Pain is under good control.    /64   Wt 89.4 kg (197 lb)   LMP 2017   Breastfeeding? Yes   BMI 31.80 kg/m²   ABD Abdomen soft, non-tender. BS normal. No masses or organomegaly  Incision healing normally, dry and intact  Left leg is swollen greater than right, no Homans side no edema    Assessment and plan  Status post  delivery  No complications incision healing well  Followup in 4 weeks for final postpartum checkup  Appointment is obtained at outpatient radiology for stat duplex Doppler of the left lower extremity

## 2018-10-25 ENCOUNTER — NON-PROVIDER VISIT (OUTPATIENT)
Dept: URGENT CARE | Facility: PHYSICIAN GROUP | Age: 43
End: 2018-10-25

## 2018-10-25 DIAGNOSIS — Z02.1 PRE-EMPLOYMENT DRUG SCREENING: ICD-10-CM

## 2018-10-25 LAB
AMP AMPHETAMINE: NORMAL
COC COCAINE: NORMAL
INT CON NEG: NEGATIVE
INT CON POS: POSITIVE
MET METHAMPHETAMINES: NORMAL
OPI OPIATES: NORMAL
PCP PHENCYCLIDINE: NORMAL
POC DRUG COMMENT 753798-OCCUPATIONAL HEALTH: NORMAL
THC: NORMAL

## 2018-10-25 PROCEDURE — 80305 DRUG TEST PRSMV DIR OPT OBS: CPT | Performed by: NURSE PRACTITIONER

## 2019-06-14 ENCOUNTER — APPOINTMENT (OUTPATIENT)
Dept: RADIOLOGY | Facility: MEDICAL CENTER | Age: 44
End: 2019-06-14
Attending: EMERGENCY MEDICINE
Payer: MEDICAID

## 2019-06-14 ENCOUNTER — HOSPITAL ENCOUNTER (EMERGENCY)
Facility: MEDICAL CENTER | Age: 44
End: 2019-06-15
Attending: EMERGENCY MEDICINE
Payer: MEDICAID

## 2019-06-14 VITALS
WEIGHT: 205.69 LBS | HEIGHT: 63 IN | BODY MASS INDEX: 36.45 KG/M2 | SYSTOLIC BLOOD PRESSURE: 103 MMHG | OXYGEN SATURATION: 97 % | DIASTOLIC BLOOD PRESSURE: 68 MMHG | HEART RATE: 75 BPM | TEMPERATURE: 97.7 F | RESPIRATION RATE: 16 BRPM

## 2019-06-14 DIAGNOSIS — R10.30 LOWER ABDOMINAL PAIN: ICD-10-CM

## 2019-06-14 DIAGNOSIS — O20.0 THREATENED MISCARRIAGE: ICD-10-CM

## 2019-06-14 DIAGNOSIS — N93.9 VAGINAL BLEEDING: ICD-10-CM

## 2019-06-14 LAB
ALBUMIN SERPL BCP-MCNC: 4 G/DL (ref 3.2–4.9)
ALBUMIN/GLOB SERPL: 1.4 G/DL
ALP SERPL-CCNC: 79 U/L (ref 30–99)
ALT SERPL-CCNC: 19 U/L (ref 2–50)
ANION GAP SERPL CALC-SCNC: 8 MMOL/L (ref 0–11.9)
APPEARANCE UR: CLEAR
AST SERPL-CCNC: 23 U/L (ref 12–45)
BACTERIA #/AREA URNS HPF: ABNORMAL /HPF
BASOPHILS # BLD AUTO: 0.6 % (ref 0–1.8)
BASOPHILS # BLD: 0.06 K/UL (ref 0–0.12)
BILIRUB SERPL-MCNC: 0.4 MG/DL (ref 0.1–1.5)
BILIRUB UR QL STRIP.AUTO: NEGATIVE
BUN SERPL-MCNC: 12 MG/DL (ref 8–22)
CALCIUM SERPL-MCNC: 9.3 MG/DL (ref 8.5–10.5)
CHLORIDE SERPL-SCNC: 106 MMOL/L (ref 96–112)
CO2 SERPL-SCNC: 23 MMOL/L (ref 20–33)
COLOR UR: YELLOW
CREAT SERPL-MCNC: 0.79 MG/DL (ref 0.5–1.4)
EOSINOPHIL # BLD AUTO: 0.16 K/UL (ref 0–0.51)
EOSINOPHIL NFR BLD: 1.7 % (ref 0–6.9)
EPI CELLS #/AREA URNS HPF: NEGATIVE /HPF
ERYTHROCYTE [DISTWIDTH] IN BLOOD BY AUTOMATED COUNT: 42.7 FL (ref 35.9–50)
GLOBULIN SER CALC-MCNC: 2.9 G/DL (ref 1.9–3.5)
GLUCOSE SERPL-MCNC: 106 MG/DL (ref 65–99)
GLUCOSE UR STRIP.AUTO-MCNC: NEGATIVE MG/DL
HCG UR QL: POSITIVE
HCT VFR BLD AUTO: 40.5 % (ref 37–47)
HGB BLD-MCNC: 13.3 G/DL (ref 12–16)
HYALINE CASTS #/AREA URNS LPF: ABNORMAL /LPF
IMM GRANULOCYTES # BLD AUTO: 0.03 K/UL (ref 0–0.11)
IMM GRANULOCYTES NFR BLD AUTO: 0.3 % (ref 0–0.9)
KETONES UR STRIP.AUTO-MCNC: NEGATIVE MG/DL
LEUKOCYTE ESTERASE UR QL STRIP.AUTO: NEGATIVE
LYMPHOCYTES # BLD AUTO: 2.72 K/UL (ref 1–4.8)
LYMPHOCYTES NFR BLD: 28.9 % (ref 22–41)
MCH RBC QN AUTO: 27.3 PG (ref 27–33)
MCHC RBC AUTO-ENTMCNC: 32.8 G/DL (ref 33.6–35)
MCV RBC AUTO: 83.2 FL (ref 81.4–97.8)
MICRO URNS: ABNORMAL
MONOCYTES # BLD AUTO: 0.56 K/UL (ref 0–0.85)
MONOCYTES NFR BLD AUTO: 6 % (ref 0–13.4)
NEUTROPHILS # BLD AUTO: 5.88 K/UL (ref 2–7.15)
NEUTROPHILS NFR BLD: 62.5 % (ref 44–72)
NITRITE UR QL STRIP.AUTO: NEGATIVE
NRBC # BLD AUTO: 0 K/UL
NRBC BLD-RTO: 0 /100 WBC
PH UR STRIP.AUTO: 7.5 [PH]
PLATELET # BLD AUTO: 225 K/UL (ref 164–446)
PMV BLD AUTO: 9.7 FL (ref 9–12.9)
POTASSIUM SERPL-SCNC: 3.6 MMOL/L (ref 3.6–5.5)
PROT SERPL-MCNC: 6.9 G/DL (ref 6–8.2)
PROT UR QL STRIP: NEGATIVE MG/DL
RBC # BLD AUTO: 4.87 M/UL (ref 4.2–5.4)
RBC # URNS HPF: ABNORMAL /HPF
RBC UR QL AUTO: ABNORMAL
SODIUM SERPL-SCNC: 137 MMOL/L (ref 135–145)
SP GR UR REFRACTOMETRY: 1.02
UROBILINOGEN UR STRIP.AUTO-MCNC: 0.2 MG/DL
WBC # BLD AUTO: 9.4 K/UL (ref 4.8–10.8)
WBC #/AREA URNS HPF: ABNORMAL /HPF

## 2019-06-14 PROCEDURE — 80053 COMPREHEN METABOLIC PANEL: CPT

## 2019-06-14 PROCEDURE — 85025 COMPLETE CBC W/AUTO DIFF WBC: CPT

## 2019-06-14 PROCEDURE — 87491 CHLMYD TRACH DNA AMP PROBE: CPT

## 2019-06-14 PROCEDURE — 81025 URINE PREGNANCY TEST: CPT

## 2019-06-14 PROCEDURE — 36415 COLL VENOUS BLD VENIPUNCTURE: CPT

## 2019-06-14 PROCEDURE — 99284 EMERGENCY DEPT VISIT MOD MDM: CPT

## 2019-06-14 PROCEDURE — 81001 URINALYSIS AUTO W/SCOPE: CPT

## 2019-06-14 PROCEDURE — 87591 N.GONORRHOEAE DNA AMP PROB: CPT

## 2019-06-15 ENCOUNTER — APPOINTMENT (OUTPATIENT)
Dept: RADIOLOGY | Facility: MEDICAL CENTER | Age: 44
End: 2019-06-15
Attending: EMERGENCY MEDICINE
Payer: MEDICAID

## 2019-06-15 LAB
BACTERIA GENITAL QL WET PREP: NORMAL
C TRACH DNA SPEC QL NAA+PROBE: NEGATIVE
N GONORRHOEA DNA SPEC QL NAA+PROBE: NEGATIVE
SIGNIFICANT IND 70042: NORMAL
SITE SITE: NORMAL
SOURCE SOURCE: NORMAL
SPECIMEN SOURCE: NORMAL

## 2019-06-15 PROCEDURE — 76801 OB US < 14 WKS SINGLE FETUS: CPT

## 2019-06-15 NOTE — ED TRIAGE NOTES
"Silvia Atkins    Chief Complaint   Patient presents with   • Pregnancy     approx 8 weeks    • Vaginal Discharge     brown    • Vaginal Bleeding     dark red blood        Pt ambulatory to triage with above complaint. Symptoms started last night.  Pt reports LMP April 24 with POSITIVE home pregnancy test taken today.  +spotting and brown/red discharge.  +minimal lower abd cramping per patient. Pt given UA cup. VSS.    Pt returned to lobby, educated on triage process, and to inform staff of any changes or concerns.    Blood Pressure: 118/78, Pulse: 74, Respiration: 16, Temperature: 36.5 °C (97.7 °F), Height: 160 cm (5' 3\"), Weight: 93.3 kg (205 lb 11 oz), Pulse Oximetry: 96 %, O2 (LPM): 0    "

## 2019-06-15 NOTE — ED PROVIDER NOTES
ED Provider Note    Scribed for Ross Souza M.D. by Stu Thrasher. 6/14/2019  11:34 PM    CHIEF COMPLAINT  Chief Complaint   Patient presents with   • Pregnancy     approx 8 weeks    • Vaginal Discharge     brown    • Vaginal Bleeding     dark red blood        HPI  Silvia Atkins is a 43 y.o. 8 week pregnant female who presents to the ED complaining of vaginal bleeding onset yesterday. The patient states that she does not feel blood coming out of the vaginal canal and only notices it when peeing or wiping. She notes that today at around 8 pm she additionally had brown discharge from her vagina and one episode of bleeding in the ED. There are no known alleviating or exacerbating factors. The patient endorses associated lower abdominal pain, but denies any fever or vomiting.  She notes that she has never felt pain like this in the past. The patient denies any significan medical history including kidney stones, kidney infection, ovarian cysts, or ectopic pregnancy. She additionally denies taking any daily medication.     REVIEW OF SYSTEMS  Constitutional: No fevers.  GI: Lower abdominal pain. No vomiting.   : Vaginal bleeding, vaginal discharge.     PAST MEDICAL HISTORY   has a past medical history of Gestational diabetes (11/17/2017) and Headache(784.0).    SOCIAL HISTORY  Social History     Social History Main Topics   • Smoking status: Never Smoker   • Smokeless tobacco: Never Used   • Alcohol use No   • Drug use: No   • Sexual activity: Yes     Partners: Male      Comment: none. Planned pregnancy       SURGICAL HISTORY   has a past surgical history that includes tubal ligation (Bilateral, 2016); tubal coagulation laparoscopic bilateral (1998); and primary c section (1/26/2018).    CURRENT MEDICATIONS  Home Medications     Reviewed by Alanis Khan R.N. (Registered Nurse) on 06/14/19 at 2046  Med List Status: <None>  "  Medication Last Dose Status   docusate sodium 100 MG Cap  Active   ferrous sulfate 325 (65 Fe) MG tablet  Active   ibuprofen (MOTRIN) 800 MG Tab  Active   Prenatal Multivit-Min-Fe-FA (PRENATAL VITAMINS PO)  Active                ALLERGIES  No Known Allergies    PHYSICAL EXAM  VITAL SIGNS: /68   Pulse 75   Temp 36.5 °C (97.7 °F) (Temporal)   Resp 16   Ht 1.6 m (5' 3\")   Wt 93.3 kg (205 lb 11 oz)   LMP 04/24/2019 (Exact Date)   SpO2 97%   BMI 36.44 kg/m²  @DOC[530068::@   Pulse ox interpretation: I interpret this pulse ox as normal.  Genl: Female lying in bed with no apparent distress.   Head: NC/AT   ENT: Mucous membranes moist, posterior pharynx clear, uvula midline, nares patent bilaterally   Eyes: Normal sclera, pupils equal round reactive to light  Neck: Supple, FROM, no LAD appreciated   Pulmonary: Lungs are clear to auscultation bilaterally  Chest: No TTP No murmur.  CV:  RRR, no murmur appreciated, pulses 2+ in both upper and lower extremities,  Abdomen: Obese. Tenderness to de-palpation of suprapubic and left lower quadrant.No epigastric tenderness, no Asia's sign, no McBurney's point tenderness. soft, NT/ND; no rebound/guarding, no masses palpated, no HSM   : Normal external female genitalia. Blood in vaginal vault. Blood from open os. Normal appearing cervix. No cervical motion tenderness. No adnexal tenderness.   Musculoskeletal: Pain free ROM of the neck. Moving upper and lower extremities and spontaneous in coordinated fashion  Neuro: A&Ox4 (person, place, time, situation), speech fluent, gait steady, no focal deficits appreciated, No cerebellar signs  Sensation is grossly intact in the distal upper and lower extremities  5/5 strength in  and dorsiflexion/plantar flexion of the ankles  Psych: Patient has an appropriate affect and behavior  Skin: No rash or lesions.  No pallor or jaundice.  No cyanosis.  Warm and dry.       DIAGNOSTIC STUDIES / PROCEDURES    LABS  Results for " orders placed or performed during the hospital encounter of 06/14/19   CBC with Differential   Result Value Ref Range    WBC 9.4 4.8 - 10.8 K/uL    RBC 4.87 4.20 - 5.40 M/uL    Hemoglobin 13.3 12.0 - 16.0 g/dL    Hematocrit 40.5 37.0 - 47.0 %    MCV 83.2 81.4 - 97.8 fL    MCH 27.3 27.0 - 33.0 pg    MCHC 32.8 (L) 33.6 - 35.0 g/dL    RDW 42.7 35.9 - 50.0 fL    Platelet Count 225 164 - 446 K/uL    MPV 9.7 9.0 - 12.9 fL    Neutrophils-Polys 62.50 44.00 - 72.00 %    Lymphocytes 28.90 22.00 - 41.00 %    Monocytes 6.00 0.00 - 13.40 %    Eosinophils 1.70 0.00 - 6.90 %    Basophils 0.60 0.00 - 1.80 %    Immature Granulocytes 0.30 0.00 - 0.90 %    Nucleated RBC 0.00 /100 WBC    Neutrophils (Absolute) 5.88 2.00 - 7.15 K/uL    Lymphs (Absolute) 2.72 1.00 - 4.80 K/uL    Monos (Absolute) 0.56 0.00 - 0.85 K/uL    Eos (Absolute) 0.16 0.00 - 0.51 K/uL    Baso (Absolute) 0.06 0.00 - 0.12 K/uL    Immature Granulocytes (abs) 0.03 0.00 - 0.11 K/uL    NRBC (Absolute) 0.00 K/uL   Comp Metabolic Panel   Result Value Ref Range    Sodium 137 135 - 145 mmol/L    Potassium 3.6 3.6 - 5.5 mmol/L    Chloride 106 96 - 112 mmol/L    Co2 23 20 - 33 mmol/L    Anion Gap 8.0 0.0 - 11.9    Glucose 106 (H) 65 - 99 mg/dL    Bun 12 8 - 22 mg/dL    Creatinine 0.79 0.50 - 1.40 mg/dL    Calcium 9.3 8.5 - 10.5 mg/dL    AST(SGOT) 23 12 - 45 U/L    ALT(SGPT) 19 2 - 50 U/L    Alkaline Phosphatase 79 30 - 99 U/L    Total Bilirubin 0.4 0.1 - 1.5 mg/dL    Albumin 4.0 3.2 - 4.9 g/dL    Total Protein 6.9 6.0 - 8.2 g/dL    Globulin 2.9 1.9 - 3.5 g/dL    A-G Ratio 1.4 g/dL   ESTIMATED GFR   Result Value Ref Range    GFR If African American >60 >60 mL/min/1.73 m 2    GFR If Non African American >60 >60 mL/min/1.73 m 2   URINALYSIS,CULTURE IF INDICATED   Result Value Ref Range    Color Yellow     Character Clear     Ph 7.5 5.0 - 8.0    Glucose Negative Negative mg/dL    Ketones Negative Negative mg/dL    Protein Negative Negative mg/dL    Bilirubin Negative Negative     Urobilinogen, Urine 0.2 Negative    Nitrite Negative Negative    Leukocyte Esterase Negative Negative    Occult Blood Large (A) Negative    Micro Urine Req Microscopic    BETA-HCG QUALITATIVE URINE   Result Value Ref Range    Beta-Hcg Urine Positive (A) Negative   REFRACTOMETER SG   Result Value Ref Range    Specific Gravity 1.018    URINE MICROSCOPIC (W/UA)   Result Value Ref Range    WBC 0-2 /hpf    RBC 0-2 /hpf    Bacteria Few (A) None /hpf    Epithelial Cells Negative /hpf    Hyaline Cast 0-2 /lpf   Chlamydia/GC PCR Urine Or Swab   Result Value Ref Range    Source Genital    WET PREP   Result Value Ref Range    Significant Indicator NEG     Source GEN     Site ENDOCERVICAL     Wet Prep For Parasites       No clue cells seen.  No motile Trichomonas seen.  No yeast.         RADIOLOGY  US-OB 1ST TRIMESTER WITH TRANSVAGINAL (COMBO)   Final Result                  1. Intrauterine slightly irregular gestational sac with a questionable embryo identified. No fetal heart tone detected. The findings could relate to probable early intrauterine pregnancy. Differential considerations include spontaneous . Ectopic    pregnancy cannot be entirely excluded.         Continued follow-up of serum beta HCG levels and repeat ultrasound is recommended.               COURSE & MEDICAL DECISION MAKING  Pertinent Labs & Imaging studies reviewed. (See chart for details)    Differential diagnoses include but not limited to: Pregnancy, ectopic pregnancy, ovarian cyst, cystitis, kidney stone, STI and threatened miscarriage. Ovarian abcess, torsion considered but less likely.    11:34 PM - Patient seen and examined at bedside. Ordered US-OB, chlamydia/GC PCR urine or swab, wet prep, urinalysis, beta-HCG qualitative, estimated GFR, urine microscopic, CBC with diff, CMP, and refractometer SG to evaluate her symptoms.     1:30 AM - Patient was reevaluated at bedside. Discussed lab and radiology results with the patient and informed them  about the need for follow-up ultrasound and repeat HCG in few days time. I outlined my plan for discharge at this time and the patient was understanding and agreeable to discharge.     Medical Decision Making:   Patient presents the emergency room for the symptoms as described above.  She has no signs of acute anemia based on the clinical findings, has reassuring vital signs, and clinical exam features demonstrate bleeding from the cervical loss.  Overall presenting symptomology is concerning for her first trimester miscarriage versus possible ectopic pregnancy.    Differential above is considered however at the time of evaluation and during her course of observation there is no gross metabolic abnormalities, no leukocytosis or concerning anemia, and transvaginal ultrasound demonstrates a gestational sac with no observed fetal material and no fetal heart activity.  This could be due to the early nature of this pregnancy versus inevitable .  Both of these options are discussed with the patient and she is aware of the working differential.  She is also aware of the need for repeat ultrasound testing within a next week and follow-up bhcg testing.  Patient plans on establishing with her prior obstetrician and if she does not do that she is given information for the pregnancy center.  She is also instructed that she may return to the emergency department and should do so if she is feeling lightheaded, has abdominal pain, worsening vaginal bleeding was not acting like herself.  Questions were addressed at the bedside and discussed with both the family member and the patient.  She is discharged home in stable condition.    The patient will return for new or worsening symptoms and is stable at the time of discharge.      ISPOSITION:  Patient will be discharged home in stable condition.    FOLLOW UP:  [facility], Emergency Dept  [address] [place] [id number] [phone]  In 2 days  As  needed, If symptoms worsen.  Repeat HCG Testing    [facility]  [address] [facility] [address]  [place] [address]  [phone]    Repeat HCG testing and repeat US for confirmation of intrauterine Pregnancy      OUTPATIENT MEDICATIONS:  Discharge Medication List as of 6/15/2019  1:39 AM          FINAL IMPRESSION  Visit Diagnoses     ICD-10-CM   1. Threatened miscarriage O20.0   2. Vaginal bleeding N93.9   3. Lower abdominal pain R10.30          IStu (Scribe), am scribing for, and in the presence of, Ross Souza M.D..    Electronically signed by: Stu Thrasher (Ha[place]), 6/14/2019    IRoss M.D. personally performed the services described in this documentation, as scribed by Stu Thrasher in my presence, and it is both accurate and complete.    C.    The note accurately reflects work and decisions made by me.  Ross Souza  6/15/2019  4:56 AM

## 2019-06-15 NOTE — ED NOTES
Pt ambulatory to [facility]. Agree with triage note. Pt states she has used ONE pad in the last 24 hrs. Pt reports  including this pregnancy. Pt changed into gown and placed on monitor.  Chart up and ready for ERP now.

## 2019-06-15 NOTE — DISCHARGE INSTRUCTIONS
US today showed the followin. Intrauterine slightly irregular gestational sac with a questionable embryo identified. No fetal heart tone detected. The findings could relate to probable early intrauterine pregnancy. Differential considerations include spontaneous . Ectopic   pregnancy cannot be entirely excluded.    Continued follow-up of serum beta HCG levels and repeat ultrasound is recommended.

## 2019-06-15 NOTE — ED NOTES
Pt. Provided DC instructions and pt. Understood MD recommendations. Pt. Had relative at bedside translate from Bulgarian to english. Pt. Had no questions after education was provided. Pt. Ambulatory with steady gait upon leaving the ED.

## 2019-08-10 ENCOUNTER — HOSPITAL ENCOUNTER (OUTPATIENT)
Dept: LAB | Facility: MEDICAL CENTER | Age: 44
End: 2019-08-10
Attending: FAMILY MEDICINE
Payer: MEDICAID

## 2019-08-10 LAB
ALBUMIN SERPL BCP-MCNC: 4.1 G/DL (ref 3.2–4.9)
ALBUMIN/GLOB SERPL: 1.4 G/DL
ALP SERPL-CCNC: 66 U/L (ref 30–99)
ALT SERPL-CCNC: 28 U/L (ref 2–50)
ANION GAP SERPL CALC-SCNC: 11 MMOL/L (ref 0–11.9)
AST SERPL-CCNC: 30 U/L (ref 12–45)
BILIRUB SERPL-MCNC: 0.7 MG/DL (ref 0.1–1.5)
BUN SERPL-MCNC: 12 MG/DL (ref 8–22)
CALCIUM SERPL-MCNC: 9 MG/DL (ref 8.5–10.5)
CHLORIDE SERPL-SCNC: 108 MMOL/L (ref 96–112)
CHOLEST SERPL-MCNC: 173 MG/DL (ref 100–199)
CO2 SERPL-SCNC: 21 MMOL/L (ref 20–33)
CREAT SERPL-MCNC: 0.64 MG/DL (ref 0.5–1.4)
ERYTHROCYTE [DISTWIDTH] IN BLOOD BY AUTOMATED COUNT: 41.2 FL (ref 35.9–50)
EST. AVERAGE GLUCOSE BLD GHB EST-MCNC: 111 MG/DL
FASTING STATUS PATIENT QL REPORTED: NORMAL
GLOBULIN SER CALC-MCNC: 3 G/DL (ref 1.9–3.5)
GLUCOSE SERPL-MCNC: 85 MG/DL (ref 65–99)
HBA1C MFR BLD: 5.5 % (ref 0–5.6)
HCT VFR BLD AUTO: 42.4 % (ref 37–47)
HDLC SERPL-MCNC: 45 MG/DL
HGB BLD-MCNC: 13.6 G/DL (ref 12–16)
LDLC SERPL CALC-MCNC: 115 MG/DL
MCH RBC QN AUTO: 26.5 PG (ref 27–33)
MCHC RBC AUTO-ENTMCNC: 32.1 G/DL (ref 33.6–35)
MCV RBC AUTO: 82.5 FL (ref 81.4–97.8)
PLATELET # BLD AUTO: 224 K/UL (ref 164–446)
PMV BLD AUTO: 10.9 FL (ref 9–12.9)
POTASSIUM SERPL-SCNC: 4.1 MMOL/L (ref 3.6–5.5)
PROT SERPL-MCNC: 7.1 G/DL (ref 6–8.2)
RBC # BLD AUTO: 5.14 M/UL (ref 4.2–5.4)
SODIUM SERPL-SCNC: 140 MMOL/L (ref 135–145)
T4 FREE SERPL-MCNC: 0.83 NG/DL (ref 0.53–1.43)
TRIGL SERPL-MCNC: 67 MG/DL (ref 0–149)
TSH SERPL DL<=0.005 MIU/L-ACNC: 1.16 UIU/ML (ref 0.38–5.33)
VIT B12 SERPL-MCNC: 933 PG/ML (ref 211–911)
WBC # BLD AUTO: 5.9 K/UL (ref 4.8–10.8)

## 2019-08-10 PROCEDURE — 82607 VITAMIN B-12: CPT

## 2019-08-10 PROCEDURE — 84439 ASSAY OF FREE THYROXINE: CPT

## 2019-08-10 PROCEDURE — 80053 COMPREHEN METABOLIC PANEL: CPT

## 2019-08-10 PROCEDURE — 80061 LIPID PANEL: CPT

## 2019-08-10 PROCEDURE — 84443 ASSAY THYROID STIM HORMONE: CPT

## 2019-08-10 PROCEDURE — 36415 COLL VENOUS BLD VENIPUNCTURE: CPT

## 2019-08-10 PROCEDURE — 85027 COMPLETE CBC AUTOMATED: CPT

## 2019-08-10 PROCEDURE — 83036 HEMOGLOBIN GLYCOSYLATED A1C: CPT

## 2022-08-25 NOTE — PROGRESS NOTES
L&D Progress Note    Name:   Silvia Atkins   Date/Time:  1/26/2018 4:55 PM  Gestational Age:  39w1d  Admit Date:   1/25/2018  Admitting Dx:   Pregnancy  Labor and delivery, indication for care    Subjective:  (+) contractions, in pain    Objective:   Vitals:    01/26/18 0411 01/26/18 0708 01/26/18 1000 01/26/18 1243   BP: 110/67 120/60 (!) 99/65 110/65   Pulse: 75 90 79 68   Resp:  15 16 16   Temp: 36 °C (96.8 °F) 36.6 °C (97.9 °F) 36.7 °C (98 °F) 36.7 °C (98.1 °F)   TempSrc: Temporal Temporal Temporal Temporal   Weight:       Height:       cervix - 7cm/90/0  Face presentation  AROM clear, copious AF    Meds:     Labs:  Recent Results (from the past 72 hour(s))   Hold Blood Bank Specimen (Not Tested)    Collection Time: 01/25/18  5:30 PM   Result Value Ref Range    Holding Tube - Bb DONE    CBC WITH DIFFERENTIAL    Collection Time: 01/25/18  5:30 PM   Result Value Ref Range    WBC 10.0 4.8 - 10.8 K/uL    RBC 4.18 (L) 4.20 - 5.40 M/uL    Hemoglobin 11.8 (L) 12.0 - 16.0 g/dL    Hematocrit 36.3 (L) 37.0 - 47.0 %    MCV 86.8 81.4 - 97.8 fL    MCH 28.2 27.0 - 33.0 pg    MCHC 32.5 (L) 33.6 - 35.0 g/dL    RDW 42.7 35.9 - 50.0 fL    Platelet Count 181 164 - 446 K/uL    MPV 10.5 9.0 - 12.9 fL    Neutrophils-Polys 72.90 (H) 44.00 - 72.00 %    Lymphocytes 18.40 (L) 22.00 - 41.00 %    Monocytes 6.60 0.00 - 13.40 %    Eosinophils 1.20 0.00 - 6.90 %    Basophils 0.40 0.00 - 1.80 %    Immature Granulocytes 0.50 0.00 - 0.90 %    Nucleated RBC 0.00 /100 WBC    Neutrophils (Absolute) 7.32 (H) 2.00 - 7.15 K/uL    Lymphs (Absolute) 1.85 1.00 - 4.80 K/uL    Monos (Absolute) 0.66 0.00 - 0.85 K/uL    Eos (Absolute) 0.12 0.00 - 0.51 K/uL    Baso (Absolute) 0.04 0.00 - 0.12 K/uL    Immature Granulocytes (abs) 0.05 0.00 - 0.11 K/uL    NRBC (Absolute) 0.00 K/uL   ACCU-CHEK GLUCOSE    Collection Time: 01/25/18  7:33 PM   Result Value Ref Range    Glucose - Accu-Ck 78 65 - 99 mg/dL   ACCU-CHEK GLUCOSE    Collection Time: 01/26/18  12:03 AM   Result Value Ref Range    Glucose - Accu-Ck 79 65 - 99 mg/dL   ACCU-CHEK GLUCOSE    Collection Time: 18  6:13 AM   Result Value Ref Range    Glucose - Accu-Ck 93 65 - 99 mg/dL   ACCU-CHEK GLUCOSE    Collection Time: 18 11:25 AM   Result Value Ref Range    Glucose - Accu-Ck 82 65 - 99 mg/dL         Assessment:   Gestational Age:   39w1d  Face presentation despite changes in position, hand and knees  Arrest in cervical dilatation  Discussed with the patient indications for  delivery. The patient voiced understanding of indications for  section at this time.    Discussed with the patient the risks of  delivery. The risks include infection, bleeding, scarring, damage to other organs in the area of operation. Specifically organs that can be damaged are bowel, bladder, ureters. I also discussed with the patient the risk of wound infection and wound breakdown. We discussed that these risks are greater in people that have diabetes or obesity. I also discussed the risk of emergency blood transfusion during procedure as well as emergency hysterectomy during procedure.    Patient had the opportunity to ask questions regarding procedures. All questions answered to the patient's satisfaction.  Proceed with  delivery     Patient Active Problem List    Diagnosis Date Noted   • Labor and delivery, indication for care 2018   • Gestational diabetes - A2 2017   • Advanced maternal age in multigravida 2017   • History of bilateral tubal ligation 2017   • History of reversal of tubal ligation 2017   • History of precipitous delivery 2017     Meeta Babcock M.D.           never used

## 2025-04-18 ENCOUNTER — OFFICE VISIT (OUTPATIENT)
Dept: URGENT CARE | Facility: CLINIC | Age: 50
End: 2025-04-18
Payer: MEDICAID

## 2025-04-18 ENCOUNTER — PHARMACY VISIT (OUTPATIENT)
Dept: PHARMACY | Facility: MEDICAL CENTER | Age: 50
End: 2025-04-18
Payer: COMMERCIAL

## 2025-04-18 VITALS
DIASTOLIC BLOOD PRESSURE: 86 MMHG | TEMPERATURE: 97.1 F | HEART RATE: 86 BPM | SYSTOLIC BLOOD PRESSURE: 124 MMHG | RESPIRATION RATE: 18 BRPM | HEIGHT: 63 IN | WEIGHT: 228 LBS | OXYGEN SATURATION: 98 % | BODY MASS INDEX: 40.4 KG/M2

## 2025-04-18 DIAGNOSIS — J98.01 BRONCHOSPASM: ICD-10-CM

## 2025-04-18 DIAGNOSIS — J98.8 RTI (RESPIRATORY TRACT INFECTION): ICD-10-CM

## 2025-04-18 PROCEDURE — 3074F SYST BP LT 130 MM HG: CPT | Performed by: PHYSICIAN ASSISTANT

## 2025-04-18 PROCEDURE — RXMED WILLOW AMBULATORY MEDICATION CHARGE: Performed by: PHYSICIAN ASSISTANT

## 2025-04-18 PROCEDURE — 99203 OFFICE O/P NEW LOW 30 MIN: CPT | Performed by: PHYSICIAN ASSISTANT

## 2025-04-18 PROCEDURE — 3079F DIAST BP 80-89 MM HG: CPT | Performed by: PHYSICIAN ASSISTANT

## 2025-04-18 RX ORDER — DEXTROMETHORPHAN HYDROBROMIDE AND PROMETHAZINE HYDROCHLORIDE 15; 6.25 MG/5ML; MG/5ML
5 SYRUP ORAL 3 TIMES DAILY PRN
Qty: 120 ML | Refills: 0 | Status: SHIPPED | OUTPATIENT
Start: 2025-04-18 | End: 2025-04-20

## 2025-04-18 RX ORDER — AZITHROMYCIN 250 MG/1
TABLET, FILM COATED ORAL
Qty: 6 TABLET | Refills: 0 | Status: SHIPPED | OUTPATIENT
Start: 2025-04-18

## 2025-04-18 RX ORDER — ALBUTEROL SULFATE 90 UG/1
2 INHALANT RESPIRATORY (INHALATION) EVERY 6 HOURS PRN
Qty: 8.5 G | Refills: 0 | Status: SHIPPED | OUTPATIENT
Start: 2025-04-18 | End: 2025-04-18 | Stop reason: SDUPTHER

## 2025-04-18 RX ORDER — BENZONATATE 100 MG/1
100 CAPSULE ORAL 3 TIMES DAILY PRN
Qty: 30 CAPSULE | Refills: 0 | Status: SHIPPED | OUTPATIENT
Start: 2025-04-18 | End: 2025-04-18 | Stop reason: SDUPTHER

## 2025-04-18 RX ORDER — DEXTROMETHORPHAN HYDROBROMIDE AND PROMETHAZINE HYDROCHLORIDE 15; 6.25 MG/5ML; MG/5ML
5 SYRUP ORAL 3 TIMES DAILY PRN
Qty: 120 ML | Refills: 0 | Status: SHIPPED | OUTPATIENT
Start: 2025-04-18 | End: 2025-04-18 | Stop reason: SDUPTHER

## 2025-04-18 RX ORDER — AZITHROMYCIN 250 MG/1
TABLET, FILM COATED ORAL
Qty: 6 TABLET | Refills: 0 | Status: SHIPPED | OUTPATIENT
Start: 2025-04-18 | End: 2025-04-18 | Stop reason: SDUPTHER

## 2025-04-18 RX ORDER — BENZONATATE 100 MG/1
100 CAPSULE ORAL 3 TIMES DAILY PRN
Qty: 30 CAPSULE | Refills: 0 | Status: SHIPPED | OUTPATIENT
Start: 2025-04-18

## 2025-04-18 RX ORDER — ALBUTEROL SULFATE 90 UG/1
2 INHALANT RESPIRATORY (INHALATION) EVERY 6 HOURS PRN
Qty: 8.5 G | Refills: 0 | Status: SHIPPED | OUTPATIENT
Start: 2025-04-18

## 2025-04-18 ASSESSMENT — ENCOUNTER SYMPTOMS
WHEEZING: 0
FEVER: 1
SHORTNESS OF BREATH: 1
HEADACHES: 1
HEMOPTYSIS: 0
RHINORRHEA: 1
COUGH: 1
MYALGIAS: 1
SORE THROAT: 1

## 2025-04-18 NOTE — LETTER
April 18, 2025         Patient: Silvia Atkins   YOB: 1975   Date of Visit: 4/18/2025           To Whom it May Concern:    Silvia Atkins was seen in my clinic on 4/18/2025. She is excused from work on Mon. 4/21/25 due to acute illness.    If you have any questions or concerns, please don't hesitate to call.        Sincerely,           ANEL Fair-ELÍAS.  Electronically Signed

## 2025-04-19 NOTE — PROGRESS NOTES
Subjective     Silvia Atkins is a very pleasant 49 y.o. female who presents with Cough (Congestion, SOB,  headache, chills x7 days)            Cough  This is a new problem. The current episode started in the past 7 days. The problem has been gradually worsening. The problem occurs every few minutes. The cough is Productive of sputum. Associated symptoms include a fever, headaches, myalgias, nasal congestion, rhinorrhea, a sore throat and shortness of breath. Pertinent negatives include no chest pain, ear congestion, ear pain, hemoptysis, postnasal drip or wheezing. She has tried OTC cough suppressant for the symptoms. The treatment provided mild relief. There is no history of asthma or pneumonia.         PMH:  has a past medical history of Gestational diabetes (11/17/2017) and Headache(784.0).    She has no past medical history of Allergy, Anemia, Asthma, Blood transfusion without reported diagnosis, Clotting disorder (HCC), HIV (human immunodeficiency virus infection) (HCC), Hypertension, Seizure (HCC), Substance abuse (HCC), Thyroid disease, or Urinary tract infection, site not specified.  MEDS: No current outpatient medications on file.  ALLERGIES: No Known Allergies  SURGHX:   Past Surgical History:   Procedure Laterality Date    PRIMARY C SECTION  1/26/2018    Procedure: PRIMARY C SECTION;  Surgeon: Meeta Babcock M.D.;  Location: LABOR AND DELIVERY;  Service: Labor and Delivery    TUBAL LIGATION Bilateral 2016    tubal ligation reversal    TUBAL COAGULATION LAPAROSCOPIC BILATERAL  1998    in 2016 she had reversal in [facility]     SOCHX:  reports that she has never smoked. She has never used smokeless tobacco. She reports that she does not currently use alcohol. She reports that she does not use drugs.  FH: family history includes Cancer (age of onset: 60) in her mother; Diabetes in her father and mother.      Review of Systems   Constitutional:  Positive for fever.   HENT:  Positive  "for rhinorrhea and sore throat. Negative for ear pain and postnasal drip.    Respiratory:  Positive for cough and shortness of breath. Negative for hemoptysis and wheezing.    Cardiovascular:  Negative for chest pain.   Musculoskeletal:  Positive for myalgias.   Neurological:  Positive for headaches.              Objective     /86   Pulse 86   Temp 36.2 °C (97.1 °F) (Temporal)   Resp 18   Ht 1.6 m (5' 3\")   Wt 103 kg (228 lb)   SpO2 98%   BMI 40.39 kg/m²      Physical Exam  Vitals and nursing note reviewed.   Constitutional:       General: She is not in acute distress.     Appearance: Normal appearance. She is well-developed. She is not ill-appearing, toxic-appearing or diaphoretic.   HENT:      Head: Normocephalic and atraumatic.      Right Ear: Tympanic membrane, ear canal and external ear normal.      Left Ear: Tympanic membrane, ear canal and external ear normal.      Nose: Nose normal. No congestion or rhinorrhea.      Mouth/Throat:      Mouth: Mucous membranes are moist.      Pharynx: No oropharyngeal exudate or posterior oropharyngeal erythema.   Eyes:      General:         Right eye: No discharge.         Left eye: No discharge.      Conjunctiva/sclera: Conjunctivae normal.   Neck:      Vascular: No JVD.   Cardiovascular:      Rate and Rhythm: Normal rate and regular rhythm.      Pulses: Normal pulses.      Heart sounds: Normal heart sounds.   Pulmonary:      Effort: Pulmonary effort is normal. No respiratory distress.      Breath sounds: No stridor. Decreased breath sounds present. No wheezing, rhonchi or rales.      Comments: Deep painful bronchial spasmodic cough  Musculoskeletal:      Cervical back: Normal range of motion and neck supple.      Right lower leg: No edema.      Left lower leg: No edema.   Lymphadenopathy:      Cervical: No cervical adenopathy.   Skin:     General: Skin is warm and dry.   Neurological:      General: No focal deficit present.      Mental Status: She is alert and " oriented to person, place, and time. Mental status is at baseline.   Psychiatric:         Mood and Affect: Mood normal.         Behavior: Behavior normal.         Thought Content: Thought content normal.         Judgment: Judgment normal.                                  Assessment & Plan  RTI (respiratory tract infection)  This is a very pleasant 49-year-old female presenting with  7 to 10 days of cough, congestion and fatigue.  Cough is becoming more deep painful and affecting her sleep.  She has developed fever and chills in the last 24 hours.  She denies chest pain, leg swelling, calf tenderness or hemoptysis.  Eating and drinking without vomiting or diarrhea.  No pertinent past respiratory history.  Vital signs stable PO2 adequate.  Deep painful bronchial spasmodic cough with decreased breath sounds.  Thankfully, no audible wheezing rhonchi rales or stridor.  Remainder of exam reassuring. OTC meds and conservative measures as discussed      Orders:    albuterol 108 (90 Base) MCG/ACT Aero Soln inhalation aerosol; Inhale 2 Puffs every 6 hours as needed for Shortness of Breath.    azithromycin (ZITHROMAX) 250 MG Tab; Z-jeb, Use as directed.    benzonatate (TESSALON) 100 MG Cap; Take 1 Capsule by mouth 3 times a day as needed for Cough.    promethazine-dextromethorphan (PROMETHAZINE-DM) 6.25-15 MG/5ML syrup; Take 5 mL by mouth 3 times a day as needed for Cough.    Bronchospasm    Orders:    albuterol 108 (90 Base) MCG/ACT Aero Soln inhalation aerosol; Inhale 2 Puffs every 6 hours as needed for Shortness of Breath.           I personally reviewed prior external notes and test results pertinent to today's visit. Return to clinic or go to ED if symptoms worsen or persist. Red flag symptoms and indications for ED discussed at length. Patient/Parent/Guardian voices understanding.  AVS with post-visit instructions printed and provided or given verbally.  Follow-up with your primary care provider in 3-5 days. All side  effects and potential interactions of prescribed medication discussed including allergic response, GI upset, tendon injury, rash, sedation, OCP effectiveness, etc.    Please note that this dictation was created using voice recognition software. I have made every reasonable attempt to correct obvious errors, but I expect that there are errors of grammar and possibly content that I did not discover before finalizing the note.

## 2025-04-20 ENCOUNTER — HOSPITAL ENCOUNTER (EMERGENCY)
Facility: MEDICAL CENTER | Age: 50
End: 2025-04-20
Attending: STUDENT IN AN ORGANIZED HEALTH CARE EDUCATION/TRAINING PROGRAM
Payer: MEDICAID

## 2025-04-20 ENCOUNTER — PHARMACY VISIT (OUTPATIENT)
Dept: PHARMACY | Facility: MEDICAL CENTER | Age: 50
End: 2025-04-20
Payer: COMMERCIAL

## 2025-04-20 ENCOUNTER — APPOINTMENT (OUTPATIENT)
Dept: RADIOLOGY | Facility: MEDICAL CENTER | Age: 50
End: 2025-04-20
Attending: STUDENT IN AN ORGANIZED HEALTH CARE EDUCATION/TRAINING PROGRAM
Payer: MEDICAID

## 2025-04-20 VITALS
SYSTOLIC BLOOD PRESSURE: 161 MMHG | TEMPERATURE: 98.8 F | HEART RATE: 81 BPM | HEIGHT: 63 IN | DIASTOLIC BLOOD PRESSURE: 95 MMHG | BODY MASS INDEX: 40.51 KG/M2 | WEIGHT: 228.62 LBS | OXYGEN SATURATION: 94 % | RESPIRATION RATE: 16 BRPM

## 2025-04-20 DIAGNOSIS — R05.1 ACUTE COUGH: ICD-10-CM

## 2025-04-20 DIAGNOSIS — J98.8 RTI (RESPIRATORY TRACT INFECTION): ICD-10-CM

## 2025-04-20 DIAGNOSIS — J10.1 INFLUENZA B: Primary | ICD-10-CM

## 2025-04-20 LAB
EKG IMPRESSION: NORMAL
FLUAV RNA SPEC QL NAA+PROBE: NEGATIVE
FLUBV RNA SPEC QL NAA+PROBE: POSITIVE
RSV RNA SPEC QL NAA+PROBE: NEGATIVE
SARS-COV-2 RNA RESP QL NAA+PROBE: NOTDETECTED

## 2025-04-20 PROCEDURE — 700102 HCHG RX REV CODE 250 W/ 637 OVERRIDE(OP): Mod: UD | Performed by: STUDENT IN AN ORGANIZED HEALTH CARE EDUCATION/TRAINING PROGRAM

## 2025-04-20 PROCEDURE — 71045 X-RAY EXAM CHEST 1 VIEW: CPT

## 2025-04-20 PROCEDURE — 99284 EMERGENCY DEPT VISIT MOD MDM: CPT

## 2025-04-20 PROCEDURE — RXMED WILLOW AMBULATORY MEDICATION CHARGE: Performed by: STUDENT IN AN ORGANIZED HEALTH CARE EDUCATION/TRAINING PROGRAM

## 2025-04-20 PROCEDURE — 0241U HCHG SARS-COV-2 COVID-19 NFCT DS RESP RNA 4 TRGT ED POC: CPT

## 2025-04-20 PROCEDURE — A9270 NON-COVERED ITEM OR SERVICE: HCPCS | Mod: UD | Performed by: STUDENT IN AN ORGANIZED HEALTH CARE EDUCATION/TRAINING PROGRAM

## 2025-04-20 PROCEDURE — 93005 ELECTROCARDIOGRAM TRACING: CPT | Mod: TC | Performed by: STUDENT IN AN ORGANIZED HEALTH CARE EDUCATION/TRAINING PROGRAM

## 2025-04-20 PROCEDURE — 93005 ELECTROCARDIOGRAM TRACING: CPT | Mod: TC

## 2025-04-20 RX ORDER — DEXTROMETHORPHAN POLISTIREX 30 MG/5ML
60 SUSPENSION ORAL ONCE
Status: COMPLETED | OUTPATIENT
Start: 2025-04-20 | End: 2025-04-20

## 2025-04-20 RX ORDER — IBUPROFEN 400 MG/1
400 TABLET, FILM COATED ORAL EVERY 6 HOURS PRN
Qty: 30 TABLET | Refills: 0 | Status: SHIPPED | OUTPATIENT
Start: 2025-04-20

## 2025-04-20 RX ORDER — ACETAMINOPHEN 325 MG/1
325 TABLET ORAL EVERY 4 HOURS PRN
Qty: 30 TABLET | Refills: 0 | Status: SHIPPED | OUTPATIENT
Start: 2025-04-20

## 2025-04-20 RX ORDER — IBUPROFEN 600 MG/1
600 TABLET, FILM COATED ORAL ONCE
Status: COMPLETED | OUTPATIENT
Start: 2025-04-20 | End: 2025-04-20

## 2025-04-20 RX ORDER — DEXTROMETHORPHAN HYDROBROMIDE AND PROMETHAZINE HYDROCHLORIDE 15; 6.25 MG/5ML; MG/5ML
5 SYRUP ORAL 3 TIMES DAILY PRN
Qty: 180 ML | Refills: 0 | Status: SHIPPED | OUTPATIENT
Start: 2025-04-20

## 2025-04-20 RX ORDER — ACETAMINOPHEN 325 MG/1
650 TABLET ORAL ONCE
Status: COMPLETED | OUTPATIENT
Start: 2025-04-20 | End: 2025-04-20

## 2025-04-20 RX ADMIN — ACETAMINOPHEN 650 MG: 325 TABLET ORAL at 20:47

## 2025-04-20 RX ADMIN — IBUPROFEN 600 MG: 600 TABLET, FILM COATED ORAL at 20:47

## 2025-04-20 RX ADMIN — DEXTROMETHORPHAN 60 MG: 30 SUSPENSION, EXTENDED RELEASE ORAL at 20:55

## 2025-04-21 NOTE — ED TRIAGE NOTES
Chief Complaint   Patient presents with    Cough     Patient is endorsing she has had a cough and flu-like symptoms since 4/10. Patient endorses she was recently seen at urgent care for a fever for these symptoms on 4/18 where she was prescribed azithromycin tablets, albuterol inhaler, benzonatate, and dextromethorphan that have not relieved her symptoms.     COVID test conducted in triage. Patient A&O4 on RA and ambulatory, accompanied by family.

## 2025-04-21 NOTE — ED NOTES
Pt ambulatory from  lobby to Ortho 02. Connected to monitor, bed in lowest position, side rails up, given call light. Chart up for ERP.

## 2025-04-21 NOTE — DISCHARGE INSTRUCTIONS
You are seen and evaluated the emergency department for your cough.  Your viral studies show that you have an influenza B infection.  Your x-ray did not show any signs of bacterial pneumonia.  I do recommend continue to take your albuterol inhaler as needed, the benzonatate capsules as well as the promethazine syrup for cough.  Unfortunately, there are not very many good medications for cough.  I would recommend in addition to these other medications using a teaspoon of honey, some hot liquids as needed.  Please follow-up with the above clinics, your primary care provider.  Please continue to use the prescribed Tylenol and anti-inflammatories to help with your pain after your cough.  Please return to the emergency department should you develop any other concerning symptoms.

## (undated) DEVICE — TUBING CLEARLINK DUO-VENT - C-FLO (48EA/CA)

## (undated) DEVICE — KIT  I.V. START (100EA/CA)

## (undated) DEVICE — SUTURE 0 VICRYL PLUS CT-1 - 36 INCH (36/BX)

## (undated) DEVICE — CATHETER IV NON-SAFETY 18 GA X 1 1/4 (50/BX 4BX/CA)

## (undated) DEVICE — DETERGENT RENUZYME PLUS 10 OZ PACKET (50/BX)

## (undated) DEVICE — STAPLER SKIN DISP - (6/BX 10BX/CA) VISISTAT

## (undated) DEVICE — SET EXTENSION WITH 2 PORTS (48EA/CA) ***PART #2C8610 IS A SUBSTITUTE*****

## (undated) DEVICE — ELECTRODE DUAL RETURN W/ CORD - (50/PK)

## (undated) DEVICE — SODIUM CHL IRRIGATION 0.9% 1000ML (12EA/CA)

## (undated) DEVICE — SUTURE 1 CHROMIC GUTCT-1 27 (36PK/BX)"

## (undated) DEVICE — WATER IRRIG. STER. 1500 ML - (9/CA)

## (undated) DEVICE — TRAY SPINAL ANESTHESIA NON-SAFETY (10/CA)

## (undated) DEVICE — GLOVE BIOGEL SZ 6.5 SURGICAL PF LTX (50PR/BX 4BX/CA)

## (undated) DEVICE — SUTURE 3-0 VICRYL PLUS CT-1 - 36 INCH (36/BX)

## (undated) DEVICE — HEAD HOLDER JUNIOR/ADULT

## (undated) DEVICE — PACK C-SECTION (2EA/CA)